# Patient Record
Sex: FEMALE | Race: WHITE | NOT HISPANIC OR LATINO
[De-identification: names, ages, dates, MRNs, and addresses within clinical notes are randomized per-mention and may not be internally consistent; named-entity substitution may affect disease eponyms.]

---

## 2018-02-22 ENCOUNTER — RESULT REVIEW (OUTPATIENT)
Age: 53
End: 2018-02-22

## 2018-04-13 PROBLEM — Z00.00 ENCOUNTER FOR PREVENTIVE HEALTH EXAMINATION: Status: ACTIVE | Noted: 2018-04-13

## 2018-04-16 ENCOUNTER — APPOINTMENT (OUTPATIENT)
Dept: ALLERGY | Facility: CLINIC | Age: 53
End: 2018-04-16
Payer: COMMERCIAL

## 2018-04-16 VITALS
HEART RATE: 80 BPM | HEIGHT: 72 IN | BODY MASS INDEX: 16.12 KG/M2 | SYSTOLIC BLOOD PRESSURE: 110 MMHG | DIASTOLIC BLOOD PRESSURE: 80 MMHG | WEIGHT: 119 LBS | RESPIRATION RATE: 14 BRPM

## 2018-04-16 PROCEDURE — 99203 OFFICE O/P NEW LOW 30 MIN: CPT

## 2018-10-01 ENCOUNTER — RESULT REVIEW (OUTPATIENT)
Age: 53
End: 2018-10-01

## 2018-11-07 ENCOUNTER — RESULT REVIEW (OUTPATIENT)
Age: 53
End: 2018-11-07

## 2019-01-30 ENCOUNTER — APPOINTMENT (OUTPATIENT)
Dept: ULTRASOUND IMAGING | Facility: IMAGING CENTER | Age: 54
End: 2019-01-30
Payer: COMMERCIAL

## 2019-01-30 ENCOUNTER — RESULT REVIEW (OUTPATIENT)
Age: 54
End: 2019-01-30

## 2019-01-30 ENCOUNTER — OUTPATIENT (OUTPATIENT)
Dept: OUTPATIENT SERVICES | Facility: HOSPITAL | Age: 54
LOS: 1 days | End: 2019-01-30
Payer: COMMERCIAL

## 2019-01-30 ENCOUNTER — APPOINTMENT (OUTPATIENT)
Dept: MAMMOGRAPHY | Facility: IMAGING CENTER | Age: 54
End: 2019-01-30
Payer: COMMERCIAL

## 2019-01-30 DIAGNOSIS — Z00.8 ENCOUNTER FOR OTHER GENERAL EXAMINATION: ICD-10-CM

## 2019-01-30 PROCEDURE — 88305 TISSUE EXAM BY PATHOLOGIST: CPT

## 2019-01-30 PROCEDURE — 88305 TISSUE EXAM BY PATHOLOGIST: CPT | Mod: 26

## 2019-01-30 PROCEDURE — 19083 BX BREAST 1ST LESION US IMAG: CPT | Mod: LT

## 2019-01-30 PROCEDURE — 88360 TUMOR IMMUNOHISTOCHEM/MANUAL: CPT

## 2019-01-30 PROCEDURE — 19081 BX BREAST 1ST LESION STRTCTC: CPT | Mod: LT

## 2019-01-30 PROCEDURE — 19083 BX BREAST 1ST LESION US IMAG: CPT

## 2019-01-30 PROCEDURE — 88360 TUMOR IMMUNOHISTOCHEM/MANUAL: CPT | Mod: 26

## 2019-01-30 PROCEDURE — 77065 DX MAMMO INCL CAD UNI: CPT

## 2019-01-30 PROCEDURE — A4648: CPT

## 2019-01-30 PROCEDURE — 77065 DX MAMMO INCL CAD UNI: CPT | Mod: 26,LT

## 2019-01-30 PROCEDURE — 19081 BX BREAST 1ST LESION STRTCTC: CPT

## 2019-02-01 ENCOUNTER — APPOINTMENT (OUTPATIENT)
Dept: MRI IMAGING | Facility: CLINIC | Age: 54
End: 2019-02-01
Payer: COMMERCIAL

## 2019-02-01 ENCOUNTER — OUTPATIENT (OUTPATIENT)
Dept: OUTPATIENT SERVICES | Facility: HOSPITAL | Age: 54
LOS: 1 days | End: 2019-02-01
Payer: COMMERCIAL

## 2019-02-01 DIAGNOSIS — Z00.8 ENCOUNTER FOR OTHER GENERAL EXAMINATION: ICD-10-CM

## 2019-02-01 DIAGNOSIS — C50.812 MALIGNANT NEOPLASM OF OVERLAPPING SITES OF LEFT FEMALE BREAST: ICD-10-CM

## 2019-02-01 PROCEDURE — C8937: CPT

## 2019-02-01 PROCEDURE — 77049 MRI BREAST C-+ W/CAD BI: CPT | Mod: 26

## 2019-02-01 PROCEDURE — C8908: CPT

## 2019-02-01 PROCEDURE — A9585: CPT

## 2019-02-06 ENCOUNTER — RESULT REVIEW (OUTPATIENT)
Age: 54
End: 2019-02-06

## 2019-02-21 ENCOUNTER — OUTPATIENT (OUTPATIENT)
Dept: OUTPATIENT SERVICES | Facility: HOSPITAL | Age: 54
LOS: 1 days | End: 2019-02-21
Payer: COMMERCIAL

## 2019-02-21 VITALS
WEIGHT: 117.95 LBS | DIASTOLIC BLOOD PRESSURE: 70 MMHG | HEIGHT: 67 IN | RESPIRATION RATE: 16 BRPM | OXYGEN SATURATION: 100 % | TEMPERATURE: 99 F | SYSTOLIC BLOOD PRESSURE: 120 MMHG | HEART RATE: 68 BPM

## 2019-02-21 DIAGNOSIS — Z98.890 OTHER SPECIFIED POSTPROCEDURAL STATES: Chronic | ICD-10-CM

## 2019-02-21 DIAGNOSIS — D49.3 NEOPLASM OF UNSPECIFIED BEHAVIOR OF BREAST: ICD-10-CM

## 2019-02-21 DIAGNOSIS — C50.812 MALIGNANT NEOPLASM OF OVERLAPPING SITES OF LEFT FEMALE BREAST: ICD-10-CM

## 2019-02-21 DIAGNOSIS — Z01.818 ENCOUNTER FOR OTHER PREPROCEDURAL EXAMINATION: ICD-10-CM

## 2019-02-21 DIAGNOSIS — D05.02 LOBULAR CARCINOMA IN SITU OF LEFT BREAST: ICD-10-CM

## 2019-02-21 LAB
ANION GAP SERPL CALC-SCNC: 12 MMOL/L — SIGNIFICANT CHANGE UP (ref 5–17)
BUN SERPL-MCNC: 9 MG/DL — SIGNIFICANT CHANGE UP (ref 7–23)
CALCIUM SERPL-MCNC: 10 MG/DL — SIGNIFICANT CHANGE UP (ref 8.4–10.5)
CHLORIDE SERPL-SCNC: 99 MMOL/L — SIGNIFICANT CHANGE UP (ref 96–108)
CO2 SERPL-SCNC: 28 MMOL/L — SIGNIFICANT CHANGE UP (ref 22–31)
CREAT SERPL-MCNC: 0.73 MG/DL — SIGNIFICANT CHANGE UP (ref 0.5–1.3)
GLUCOSE SERPL-MCNC: 85 MG/DL — SIGNIFICANT CHANGE UP (ref 70–99)
POTASSIUM SERPL-MCNC: 3.9 MMOL/L — SIGNIFICANT CHANGE UP (ref 3.5–5.3)
POTASSIUM SERPL-SCNC: 3.9 MMOL/L — SIGNIFICANT CHANGE UP (ref 3.5–5.3)
SODIUM SERPL-SCNC: 139 MMOL/L — SIGNIFICANT CHANGE UP (ref 135–145)

## 2019-02-21 PROCEDURE — G0463: CPT

## 2019-02-21 PROCEDURE — 85027 COMPLETE CBC AUTOMATED: CPT

## 2019-02-21 PROCEDURE — 80048 BASIC METABOLIC PNL TOTAL CA: CPT

## 2019-02-21 RX ORDER — LIDOCAINE HCL 20 MG/ML
0.1 VIAL (ML) INJECTION ONCE
Qty: 0 | Refills: 0 | Status: DISCONTINUED | OUTPATIENT
Start: 2019-03-07 | End: 2019-03-22

## 2019-02-21 RX ORDER — SODIUM CHLORIDE 9 MG/ML
3 INJECTION INTRAMUSCULAR; INTRAVENOUS; SUBCUTANEOUS EVERY 8 HOURS
Qty: 0 | Refills: 0 | Status: DISCONTINUED | OUTPATIENT
Start: 2019-03-07 | End: 2019-03-22

## 2019-02-21 NOTE — H&P PST ADULT - PROBLEM SELECTOR PLAN 1
left breast lumpectomy post STALIN  reflector placement, left axillary sentinel lymph node biopsy, possible left axillary lymph node dissection  Labs- CBC, BMP

## 2019-02-21 NOTE — H&P PST ADULT - ATTENDING COMMENTS
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  She presents to undergo a left radhika  localization lumpectomy, left axillary sentinel lymph node biopsy, possible left axillary lymph node dissection and left upper outer breast radhika  localization breast biopsy.

## 2019-02-21 NOTE — H&P PST ADULT - NSANTHOSAYNRD_GEN_A_CORE
No. JESSA screening performed.  STOP BANG Legend: 0-2 = LOW Risk; 3-4 = INTERMEDIATE Risk; 5-8 = HIGH Risk

## 2019-02-21 NOTE — H&P PST ADULT - CARDIOVASCULAR
CC:  cough        Cough  This is a new problem. The current episode started 2 days ago. The problem has been unchanged. The problem occurs constantly. The cough is dry. Associated symptoms include : fatigue, muscle aches, fever. Pertinent negatives include no   headaches, nausea, vomiting, diarrhea, sweats, weight loss or wheezing. Nothing aggravates the symptoms.  Patient has tried nothing for the symptoms. There is no history of asthma.          #2.  C/o dysuria, frequency x 7 d.   Denies flank pain or hematuria          Past Medical History:   Diagnosis Date   • Arthritis    • Diverticulitis     medicated   • GERD (gastroesophageal reflux disease)     medicated   • Glaucoma    • Heart burn    • Hiatus hernia syndrome    • Hypertension     medicated   • Unspecified urinary incontinence          Social History   Substance Use Topics   • Smoking status: Never Smoker   • Smokeless tobacco: Never Used   • Alcohol use 0.0 oz/week      Comment: occassional wine         Current Outpatient Prescriptions on File Prior to Visit   Medication Sig Dispense Refill   • amLODIPine (NORVASC) 5 MG Tab TAKE 1 TABLET BY MOUTH ONCE DAILY 90 Tab 0   • lisinopril (PRINIVIL) 10 MG Tab TAKE 1 TABLET BY MOUTH EVERY 12 HOURS 180 Tab 3   • metoprolol SR (TOPROL XL) 25 MG TABLET SR 24 HR Take 1 Tab by mouth every day. 90 Tab 3   • omeprazole (PRILOSEC) 20 MG delayed-release capsule TAKE ONE CAPSULE BY MOUTH TWICE A DAY 30MINUTES BEFORE BREAKFAST AND DINNER 60 Cap 11   • doxycycline (VIBRAMYCIN) 100 MG Tab Take 1 Tab by mouth 2 times a day. 14 Tab 0   • fluticasone (FLONASE) 50 MCG/ACT nasal spray ONE SPRAY EACH NOSTRIL TWO TIMES PER DAY. 1 Bottle 1   • acyclovir (ZOVIRAX) 5 % Ointment Apply to affected area(s) every 3 hours. 15 g 1   • meclizine (ANTIVERT) 25 MG Tab TAKE 1 TAB BY MOUTH 3 TIMES A DAY AS NEEDED FOR DIZZINESS. 30 Tab 0   • azelastine (ASTELIN) 137 MCG/SPRAY nasal spray INSTILL 1 SPRAY INTO NOSTRIL TWICE DAILY 90 [place] 2   •  levothyroxine (SYNTHROID) 75 MCG Tab Take 1 Tab by mouth every day. 90 Tab 3   • Cholecalciferol (VITAMIN D PO) Take 1 Tab by mouth every day.     • travoprost (TRAVATAN) 0.004 % SOLN Place 1 Drop in both eyes every evening.       No current facility-administered medications on file prior to visit.                     Review of Systems   Constitutional: Negative for fever and weight loss.   HENT: negative for otalgia  Cardiovascular - denies chest pain or dyspnea  Respiratory: Positive for cough.  .  Negative for wheezing.    Neurological: Negative for headaches.   GI - denies nausea, vomiting or diarrhea  Neuro - denies numbness or tingling.            Objective:     Blood pressure 108/56, pulse 62, temperature 37.5 °C (99.5 °F), resp. rate 16, weight 66.2 kg (146 lb), SpO2 93 %.    Physical Exam   Constitutional: patient is oriented to person, place, and time. Patient appears well-developed and well-nourished. No distress.   HENT:   Head: Normocephalic and atraumatic.   Right Ear: External ear normal.   Left Ear: External ear normal.   Nose: Mucosal edema  present. Right sinus exhibits no maxillary sinus tenderness. Left sinus exhibits no maxillary sinus tenderness.   Mouth/Throat: Mucous membranes are normal. No oral lesions.  No posterior pharyngeal erythema.  No oropharyngeal exudate or posterior oropharyngeal edema.   Eyes: Conjunctivae and EOM are normal. Pupils are equal, round, and reactive to light. Right eye exhibits no discharge. Left eye exhibits no discharge. No scleral icterus.   Neck: Normal range of motion. Neck supple. No tracheal deviation present.   Cardiovascular: Normal rate, regular rhythm and normal heart sounds.  Exam reveals no friction rub.    Pulmonary/Chest: Effort normal. No respiratory distress. Patient has no wheezes or rhonchi. Patient has no rales.    Musculoskeletal:  exhibits no edema.   Lymphadenopathy:     Patient has no cervical adenopathy.      Neurological: patient is alert  and oriented to person, place, and time.   Skin: Skin is warm and dry. No rash noted. No erythema.   Psychiatric: patient  has a normal mood and affect.  behavior is normal.   Nursing note and vitals reviewed.              Assessment/Plan:       1. Acute cystitis without hematuria  -UA c/w UTI   - nitrofurantoin monohydr macro (MACROBID) 100 MG Cap; Take 1 Cap by mouth every 12 hours for 5 days.  Dispense: 10 Cap; Refill: 0      2. Viral URI with cough   - benzonatate (TESSALON) 100 MG Cap; Take 1 Cap by mouth 3 times a day as needed for Cough.  Dispense: 30 Cap; Refill: 0    Follow up in one week if no improvement     negative Regular rate & rhythm, normal S1, S2; no murmurs, gallops or rubs; no S3, S4

## 2019-02-21 NOTE — H&P PST ADULT - HISTORY OF PRESENT ILLNESS
55 yo female c/o abnormal mammogram in  01/2019- s/o biopsy revealed malignant neoplasm breast. Pt scheduled for left breast lumpectomy post STALIN  reflector placement, left axillary sentinel lymph node biopsy, possible left axillary lymph node dissection on 03/07/2019.

## 2019-02-22 ENCOUNTER — OUTPATIENT (OUTPATIENT)
Dept: OUTPATIENT SERVICES | Facility: HOSPITAL | Age: 54
LOS: 1 days | End: 2019-02-22
Payer: COMMERCIAL

## 2019-02-22 ENCOUNTER — APPOINTMENT (OUTPATIENT)
Dept: ULTRASOUND IMAGING | Facility: IMAGING CENTER | Age: 54
End: 2019-02-22
Payer: COMMERCIAL

## 2019-02-22 DIAGNOSIS — Z00.8 ENCOUNTER FOR OTHER GENERAL EXAMINATION: ICD-10-CM

## 2019-02-22 DIAGNOSIS — Z98.890 OTHER SPECIFIED POSTPROCEDURAL STATES: Chronic | ICD-10-CM

## 2019-02-22 LAB
HCT VFR BLD CALC: 41.2 % — SIGNIFICANT CHANGE UP (ref 34.5–45)
HGB BLD-MCNC: 13.4 G/DL — SIGNIFICANT CHANGE UP (ref 11.5–15.5)
MCHC RBC-ENTMCNC: 32 PG — SIGNIFICANT CHANGE UP (ref 27–34)
MCHC RBC-ENTMCNC: 32.5 GM/DL — SIGNIFICANT CHANGE UP (ref 32–36)
MCV RBC AUTO: 98.3 FL — SIGNIFICANT CHANGE UP (ref 80–100)
PLATELET # BLD AUTO: 322 K/UL — SIGNIFICANT CHANGE UP (ref 150–400)
RBC # BLD: 4.19 M/UL — SIGNIFICANT CHANGE UP (ref 3.8–5.2)
RBC # FLD: 12.3 % — SIGNIFICANT CHANGE UP (ref 10.3–14.5)
WBC # BLD: 6.06 K/UL — SIGNIFICANT CHANGE UP (ref 3.8–10.5)
WBC # FLD AUTO: 6.06 K/UL — SIGNIFICANT CHANGE UP (ref 3.8–10.5)

## 2019-02-22 PROCEDURE — 76642 ULTRASOUND BREAST LIMITED: CPT | Mod: 26,LT

## 2019-02-22 PROCEDURE — 76642 ULTRASOUND BREAST LIMITED: CPT

## 2019-03-05 ENCOUNTER — OUTPATIENT (OUTPATIENT)
Dept: OUTPATIENT SERVICES | Facility: HOSPITAL | Age: 54
LOS: 1 days | End: 2019-03-05
Payer: COMMERCIAL

## 2019-03-05 ENCOUNTER — RESULT REVIEW (OUTPATIENT)
Age: 54
End: 2019-03-05

## 2019-03-05 ENCOUNTER — APPOINTMENT (OUTPATIENT)
Dept: MAMMOGRAPHY | Facility: IMAGING CENTER | Age: 54
End: 2019-03-05
Payer: COMMERCIAL

## 2019-03-05 DIAGNOSIS — Z98.890 OTHER SPECIFIED POSTPROCEDURAL STATES: Chronic | ICD-10-CM

## 2019-03-05 DIAGNOSIS — Z00.8 ENCOUNTER FOR OTHER GENERAL EXAMINATION: ICD-10-CM

## 2019-03-05 PROBLEM — D49.3 NEOPLASM OF UNSPECIFIED BEHAVIOR OF BREAST: Chronic | Status: ACTIVE | Noted: 2019-02-21

## 2019-03-05 PROCEDURE — 19282 PERQ DEVICE BREAST EA IMAG: CPT | Mod: LT

## 2019-03-05 PROCEDURE — 19282 PERQ DEVICE BREAST EA IMAG: CPT

## 2019-03-05 PROCEDURE — 19281 PERQ DEVICE BREAST 1ST IMAG: CPT | Mod: LT

## 2019-03-05 PROCEDURE — C1739: CPT

## 2019-03-05 PROCEDURE — 19281 PERQ DEVICE BREAST 1ST IMAG: CPT

## 2019-03-06 ENCOUNTER — TRANSCRIPTION ENCOUNTER (OUTPATIENT)
Age: 54
End: 2019-03-06

## 2019-03-07 ENCOUNTER — OUTPATIENT (OUTPATIENT)
Dept: OUTPATIENT SERVICES | Facility: HOSPITAL | Age: 54
LOS: 1 days | End: 2019-03-07
Payer: COMMERCIAL

## 2019-03-07 ENCOUNTER — APPOINTMENT (OUTPATIENT)
Dept: NUCLEAR MEDICINE | Facility: HOSPITAL | Age: 54
End: 2019-03-07

## 2019-03-07 ENCOUNTER — RESULT REVIEW (OUTPATIENT)
Age: 54
End: 2019-03-07

## 2019-03-07 VITALS
DIASTOLIC BLOOD PRESSURE: 74 MMHG | TEMPERATURE: 97 F | SYSTOLIC BLOOD PRESSURE: 119 MMHG | OXYGEN SATURATION: 100 % | HEART RATE: 62 BPM | RESPIRATION RATE: 16 BRPM

## 2019-03-07 VITALS
HEART RATE: 61 BPM | HEIGHT: 67 IN | RESPIRATION RATE: 18 BRPM | SYSTOLIC BLOOD PRESSURE: 130 MMHG | DIASTOLIC BLOOD PRESSURE: 80 MMHG | WEIGHT: 117.95 LBS | OXYGEN SATURATION: 99 % | TEMPERATURE: 98 F

## 2019-03-07 DIAGNOSIS — D05.02 LOBULAR CARCINOMA IN SITU OF LEFT BREAST: ICD-10-CM

## 2019-03-07 DIAGNOSIS — Z98.890 OTHER SPECIFIED POSTPROCEDURAL STATES: Chronic | ICD-10-CM

## 2019-03-07 DIAGNOSIS — C50.812 MALIGNANT NEOPLASM OF OVERLAPPING SITES OF LEFT FEMALE BREAST: ICD-10-CM

## 2019-03-07 PROCEDURE — 88307 TISSUE EXAM BY PATHOLOGIST: CPT | Mod: 26

## 2019-03-07 PROCEDURE — 76098 X-RAY EXAM SURGICAL SPECIMEN: CPT | Mod: 26

## 2019-03-07 PROCEDURE — 88304 TISSUE EXAM BY PATHOLOGIST: CPT | Mod: 26

## 2019-03-07 PROCEDURE — 38792 RA TRACER ID OF SENTINL NODE: CPT

## 2019-03-07 PROCEDURE — 88307 TISSUE EXAM BY PATHOLOGIST: CPT

## 2019-03-07 PROCEDURE — 76098 X-RAY EXAM SURGICAL SPECIMEN: CPT

## 2019-03-07 PROCEDURE — A9541: CPT

## 2019-03-07 PROCEDURE — 19301 PARTIAL MASTECTOMY: CPT | Mod: LT

## 2019-03-07 PROCEDURE — 38525 BIOPSY/REMOVAL LYMPH NODES: CPT | Mod: LT

## 2019-03-07 PROCEDURE — 88304 TISSUE EXAM BY PATHOLOGIST: CPT

## 2019-03-07 RX ORDER — OXYCODONE HYDROCHLORIDE 5 MG/1
10 TABLET ORAL ONCE
Qty: 0 | Refills: 0 | Status: DISCONTINUED | OUTPATIENT
Start: 2019-03-07 | End: 2019-03-07

## 2019-03-07 RX ORDER — OXYCODONE HYDROCHLORIDE 5 MG/1
5 TABLET ORAL ONCE
Qty: 0 | Refills: 0 | Status: DISCONTINUED | OUTPATIENT
Start: 2019-03-07 | End: 2019-03-07

## 2019-03-07 RX ORDER — ACETAMINOPHEN 500 MG
1000 TABLET ORAL ONCE
Qty: 0 | Refills: 0 | Status: COMPLETED | OUTPATIENT
Start: 2019-03-07 | End: 2019-03-07

## 2019-03-07 RX ORDER — CELECOXIB 200 MG/1
200 CAPSULE ORAL ONCE
Qty: 0 | Refills: 0 | Status: COMPLETED | OUTPATIENT
Start: 2019-03-07 | End: 2019-03-07

## 2019-03-07 RX ORDER — ONDANSETRON 8 MG/1
4 TABLET, FILM COATED ORAL ONCE
Qty: 0 | Refills: 0 | Status: COMPLETED | OUTPATIENT
Start: 2019-03-07 | End: 2019-03-07

## 2019-03-07 RX ORDER — SODIUM CHLORIDE 9 MG/ML
1000 INJECTION, SOLUTION INTRAVENOUS
Qty: 0 | Refills: 0 | Status: DISCONTINUED | OUTPATIENT
Start: 2019-03-07 | End: 2019-03-22

## 2019-03-07 RX ORDER — CEFAZOLIN SODIUM 1 G
2000 VIAL (EA) INJECTION ONCE
Qty: 0 | Refills: 0 | Status: COMPLETED | OUTPATIENT
Start: 2019-03-07 | End: 2019-03-07

## 2019-03-07 RX ADMIN — OXYCODONE HYDROCHLORIDE 5 MILLIGRAM(S): 5 TABLET ORAL at 15:29

## 2019-03-07 RX ADMIN — ONDANSETRON 4 MILLIGRAM(S): 8 TABLET, FILM COATED ORAL at 15:30

## 2019-03-07 RX ADMIN — Medication 1000 MILLIGRAM(S): at 11:59

## 2019-03-07 RX ADMIN — CELECOXIB 200 MILLIGRAM(S): 200 CAPSULE ORAL at 11:59

## 2019-03-07 RX ADMIN — CELECOXIB 200 MILLIGRAM(S): 200 CAPSULE ORAL at 15:29

## 2019-03-07 NOTE — ASU DISCHARGE PLAN (ADULT/PEDIATRIC) - ASU DC SPECIAL INSTRUCTIONSFT
Susan M. Palleschi, MD, FACS  Specializing in Diseases and Surgery of the Breast  [address], [address], [place] [address]  Tel: [phone]		Fax: [phone]    Breast Biopsy/Lumpectomy Discharge Instructions    1.	Follow-up Appointment- Please call the office [phone]) to schedule your post-operative appointment which should be approximately 7-10 days after your surgery.     2.	Bruising/Bleeding/ Swelling – It is normal for there to be some bruising and swelling at the breast biopsy site. Some discomfort at the surgical site is also normal. If your symptoms seem excessive, or if you have any questions or concerns, please call the office.    3.	Supportive Bra- Please bring a sports/athlete bra with a front or back closure with you on the day of surgery. You will wear it home from the hospital. You should wear the supportive bra continuously for 48 hours after your procedure, including wearing it to sleep. Thereafter, you may regular bra. You may remove the bra to shower. The sports bra will provide support, decrease the amount of swelling at the biopsy site and make your recovery more comfortable.    4.	Wound Dressing – The incision(s) will be covered with a special type of surgery glue called Dermabond. It has a purplish hue and looks like plastic. It should stay on the skin until it flakes off naturally over the following 1-2 weeks. Please do NOT peel off the Dermabond. All of the stitches are “internal” and will dissolve naturally.     5.	Ice- You may apply ice to the surgical sites off and on as needed for symptomatic relief.     6.	Showering/Bathing- You may shower over the incision the very next day after surgery. Allow the water to run over the incision, but do NOT scrub the area. It is best not to sit in a bathtub or swimming pool for at least 1 week after surgery.     7.	Activity Level- You may resume most normal daily activity as tolerated, but avoid strenuous activities such as aerobics, jogging, exercising, or heavy lifting for at least 1 week after surgery. You may return to work in 1-2 days after surgery. You may drive as long as you are not taking any prescription pain medication.     8.	Pain Medication- We will send a pain medication prescription to your pharmacy. You may take the prescribed medication, or you may take extra strength Tylenol as needed. Please do NOT take aspirin, Motrin, Advil, or other anti-inflammatory medications, as these medications may cause bleeding and bruising.         Anesthesia Precautions:  For the next 12 hours do not:   •	drive a car,  •	 drink alcohol, beer, or wine,   •	make important personal or business decisions  Diet:   •	Progress diet slowly unless otherwise indicated  Physician Notification  •	Any pharmacy prescription issue  •	Bleeding that does not stop  •	Persistent nausea and vomiting  •	Pain not relieved by medications  •	Fever greater than 101®F  •	Inability to tolerate liquids or foods  •	Unable to urinate after 8 hours  Discharge and Disposition  •	Discharge to home/ group home/assisted living  •	Accompanied by Family/Spouse/ Parents/ Significant Other/ Friend/ and or Caregiver  Follow Up Care:  •	In the event that you develop a complication and you are unable to reach your own physician, you may contact:  [phone] or go to the nearest Emergency Room.   •	Please call your surgeon to schedule your follow up appointment [phone]

## 2019-03-07 NOTE — BRIEF OPERATIVE NOTE - POST-OP DX
Lobular carcinoma in situ (LCIS) of left breast  03/07/2019  upper outer  Active  Palleschi, Susan M  Malignant neoplasm of overlapping sites of left breast in female, estrogen receptor positive  03/07/2019  12:00  Active  Palleschi, Susan M

## 2019-03-07 NOTE — ASU DISCHARGE PLAN (ADULT/PEDIATRIC) - CALL YOUR DOCTOR IF YOU HAVE ANY OF THE FOLLOWING:
Nausea and vomiting that does not stop/Bleeding that does not stop/Inability to tolerate liquids or foods/Pain not relieved by Medications/Wound/Surgical Site with redness, or foul smelling discharge or pus/Swelling that gets worse

## 2019-03-07 NOTE — ASU DISCHARGE PLAN (ADULT/PEDIATRIC) - CARE PROVIDER_API CALL
Palleschi, Susan M (MD)  Surgery  [address], [address]  [place] [address]  Phone: [phone]  Fax: [phone]  Follow Up Time:

## 2019-03-07 NOTE — BRIEF OPERATIVE NOTE - OPERATION/FINDINGS
left upper outer breast specimen radiograph revealed  left 12:00 lumpectomy specimen radiograph revealed left upper lumpectomy specimen radiograph revealed both clips, both radhika  reflectors and calcifications within specimen

## 2019-03-07 NOTE — BRIEF OPERATIVE NOTE - SPECIMENS
left axillary sentinel lymph node biopsy x  , left 12:00 breast lumpectomy, left upper outer breast biopsy, left 1:30 breast core biopsy scar left axillary sentinel lymph node biopsy x 6, left 12:00 breast lumpectomy, left 1:30 breast core biopsy scar

## 2019-03-07 NOTE — BRIEF OPERATIVE NOTE - PRE-OP DX
Lobular carcinoma in situ (LCIS) of left breast  03/07/2019  left upper outer  Active  Palleschi, Susan M  Malignant neoplasm of overlapping sites of left breast in female, estrogen receptor positive  03/07/2019  12:00  Active  Palleschi, Susan M

## 2019-03-07 NOTE — BRIEF OPERATIVE NOTE - PROCEDURE
<<-----Click on this checkbox to enter Procedure Breast biopsy, left  03/07/2019  upper outer with radhika  localization  Active  SPALLESC  Lumpectomy of left breast with sentinel node biopsy  03/07/2019  12:00 with radhika  localization  Active  SPALLESC Lumpectomy of left breast with sentinel node biopsy  03/07/2019  12:00 with radhika  localization  Active  Palleschi, Susan M

## 2019-03-12 LAB — SURGICAL PATHOLOGY STUDY: SIGNIFICANT CHANGE UP

## 2019-03-15 ENCOUNTER — OUTPATIENT (OUTPATIENT)
Dept: OUTPATIENT SERVICES | Facility: HOSPITAL | Age: 54
LOS: 1 days | End: 2019-03-15
Payer: COMMERCIAL

## 2019-03-15 ENCOUNTER — RESULT REVIEW (OUTPATIENT)
Age: 54
End: 2019-03-15

## 2019-03-15 DIAGNOSIS — C50.919 MALIGNANT NEOPLASM OF UNSPECIFIED SITE OF UNSPECIFIED FEMALE BREAST: ICD-10-CM

## 2019-03-15 DIAGNOSIS — Z98.890 OTHER SPECIFIED POSTPROCEDURAL STATES: Chronic | ICD-10-CM

## 2019-03-15 PROCEDURE — 88363 XM ARCHIVE TISSUE MOLEC ANAL: CPT

## 2019-03-24 LAB — SURGICAL PATHOLOGY STUDY: SIGNIFICANT CHANGE UP

## 2019-04-01 ENCOUNTER — OUTPATIENT (OUTPATIENT)
Dept: OUTPATIENT SERVICES | Facility: HOSPITAL | Age: 54
LOS: 1 days | Discharge: HOME | End: 2019-04-01

## 2019-04-01 DIAGNOSIS — Z98.890 OTHER SPECIFIED POSTPROCEDURAL STATES: Chronic | ICD-10-CM

## 2019-04-01 DIAGNOSIS — C50.919 MALIGNANT NEOPLASM OF UNSPECIFIED SITE OF UNSPECIFIED FEMALE BREAST: ICD-10-CM

## 2019-04-05 ENCOUNTER — OUTPATIENT (OUTPATIENT)
Dept: OUTPATIENT SERVICES | Facility: HOSPITAL | Age: 54
LOS: 1 days | Discharge: HOME | End: 2019-04-05
Payer: COMMERCIAL

## 2019-04-05 DIAGNOSIS — Z98.890 OTHER SPECIFIED POSTPROCEDURAL STATES: Chronic | ICD-10-CM

## 2019-04-08 ENCOUNTER — APPOINTMENT (OUTPATIENT)
Dept: HEMATOLOGY ONCOLOGY | Facility: CLINIC | Age: 54
End: 2019-04-08
Payer: COMMERCIAL

## 2019-04-08 ENCOUNTER — RESULT REVIEW (OUTPATIENT)
Age: 54
End: 2019-04-08

## 2019-04-08 ENCOUNTER — OTHER (OUTPATIENT)
Age: 54
End: 2019-04-08

## 2019-04-08 VITALS
SYSTOLIC BLOOD PRESSURE: 136 MMHG | HEART RATE: 58 BPM | WEIGHT: 117.95 LBS | BODY MASS INDEX: 18.3 KG/M2 | RESPIRATION RATE: 16 BRPM | DIASTOLIC BLOOD PRESSURE: 84 MMHG | TEMPERATURE: 97.9 F | OXYGEN SATURATION: 100 % | HEIGHT: 67.5 IN

## 2019-04-08 DIAGNOSIS — Z84.1 FAMILY HISTORY OF DISORDERS OF KIDNEY AND URETER: ICD-10-CM

## 2019-04-08 DIAGNOSIS — Z13.79 ENCOUNTER FOR OTHER SCREENING FOR GENETIC AND CHROMOSOMAL ANOMALIES: ICD-10-CM

## 2019-04-08 DIAGNOSIS — Z87.898 PERSONAL HISTORY OF OTHER SPECIFIED CONDITIONS: ICD-10-CM

## 2019-04-08 DIAGNOSIS — N92.6 IRREGULAR MENSTRUATION, UNSPECIFIED: ICD-10-CM

## 2019-04-08 DIAGNOSIS — Z80.8 FAMILY HISTORY OF MALIGNANT NEOPLASM OF OTHER ORGANS OR SYSTEMS: ICD-10-CM

## 2019-04-08 DIAGNOSIS — Z83.79 FAMILY HISTORY OF OTHER DISEASES OF THE DIGESTIVE SYSTEM: ICD-10-CM

## 2019-04-08 LAB
ALBUMIN SERPL ELPH-MCNC: 4.7 G/DL
ALP BLD-CCNC: 57 U/L
ALT SERPL-CCNC: 13 U/L
ANION GAP SERPL CALC-SCNC: 12 MMOL/L
AST SERPL-CCNC: 26 U/L
BASOPHILS # BLD AUTO: 0.1 K/UL — SIGNIFICANT CHANGE UP (ref 0–0.2)
BASOPHILS NFR BLD AUTO: 2.4 % — HIGH (ref 0–2)
BILIRUB SERPL-MCNC: 0.4 MG/DL
BUN SERPL-MCNC: 6 MG/DL
CALCIUM SERPL-MCNC: 10 MG/DL
CHLORIDE SERPL-SCNC: 104 MMOL/L
CO2 SERPL-SCNC: 27 MMOL/L
CREAT SERPL-MCNC: 0.79 MG/DL
EOSINOPHIL # BLD AUTO: 0 K/UL — SIGNIFICANT CHANGE UP (ref 0–0.5)
EOSINOPHIL NFR BLD AUTO: 1.5 % — SIGNIFICANT CHANGE UP (ref 0–6)
ESTRADIOL SERPL-MCNC: <5 PG/ML
FSH SERPL-MCNC: 116 IU/L
GLUCOSE SERPL-MCNC: 89 MG/DL
HCT VFR BLD CALC: 40.2 % — SIGNIFICANT CHANGE UP (ref 34.5–45)
HGB BLD-MCNC: 14 G/DL — SIGNIFICANT CHANGE UP (ref 11.5–15.5)
LH SERPL-ACNC: 60.9 IU/L
LYMPHOCYTES # BLD AUTO: 1.3 K/UL — SIGNIFICANT CHANGE UP (ref 1–3.3)
LYMPHOCYTES # BLD AUTO: 42.9 % — SIGNIFICANT CHANGE UP (ref 13–44)
MCHC RBC-ENTMCNC: 32.5 PG — SIGNIFICANT CHANGE UP (ref 27–34)
MCHC RBC-ENTMCNC: 34.8 G/DL — SIGNIFICANT CHANGE UP (ref 32–36)
MCV RBC AUTO: 93.2 FL — SIGNIFICANT CHANGE UP (ref 80–100)
MONOCYTES # BLD AUTO: 0.3 K/UL — SIGNIFICANT CHANGE UP (ref 0–0.9)
MONOCYTES NFR BLD AUTO: 8.6 % — SIGNIFICANT CHANGE UP (ref 2–14)
NEUTROPHILS # BLD AUTO: 1.4 K/UL — LOW (ref 1.8–7.4)
NEUTROPHILS NFR BLD AUTO: 44.6 % — SIGNIFICANT CHANGE UP (ref 43–77)
PLATELET # BLD AUTO: 321 K/UL — SIGNIFICANT CHANGE UP (ref 150–400)
POTASSIUM SERPL-SCNC: 4.4 MMOL/L
PROT SERPL-MCNC: 7.1 G/DL
RBC # BLD: 4.32 M/UL — SIGNIFICANT CHANGE UP (ref 3.8–5.2)
RBC # FLD: 10.7 % — SIGNIFICANT CHANGE UP (ref 10.3–14.5)
SODIUM SERPL-SCNC: 143 MMOL/L
WBC # BLD: 3.1 K/UL — LOW (ref 3.8–10.5)
WBC # FLD AUTO: 3.1 K/UL — LOW (ref 3.8–10.5)

## 2019-04-08 PROCEDURE — 99245 OFF/OP CONSLTJ NEW/EST HI 55: CPT

## 2019-04-08 RX ORDER — ASPIRIN 81 MG
600-200 TABLET, DELAYED RELEASE (ENTERIC COATED) ORAL
Refills: 0 | Status: ACTIVE | COMMUNITY
Start: 2019-04-08

## 2019-04-09 ENCOUNTER — OTHER (OUTPATIENT)
Age: 54
End: 2019-04-09

## 2019-04-10 ENCOUNTER — APPOINTMENT (OUTPATIENT)
Dept: RADIATION ONCOLOGY | Facility: CLINIC | Age: 54
End: 2019-04-10
Payer: COMMERCIAL

## 2019-04-10 ENCOUNTER — OTHER (OUTPATIENT)
Age: 54
End: 2019-04-10

## 2019-04-10 VITALS
SYSTOLIC BLOOD PRESSURE: 115 MMHG | RESPIRATION RATE: 14 BRPM | HEART RATE: 67 BPM | TEMPERATURE: 97.88 F | DIASTOLIC BLOOD PRESSURE: 77 MMHG | OXYGEN SATURATION: 97 % | WEIGHT: 117 LBS | HEIGHT: 67.5 IN | BODY MASS INDEX: 18.15 KG/M2

## 2019-04-10 PROCEDURE — 99244 OFF/OP CNSLTJ NEW/EST MOD 40: CPT | Mod: 25

## 2019-04-10 RX ORDER — PREDNISONE 10 MG/1
10 TABLET ORAL
Qty: 12 | Refills: 0 | Status: DISCONTINUED | COMMUNITY
Start: 2018-04-30 | End: 2019-04-10

## 2019-04-10 RX ORDER — PREDNISONE 10 MG/1
10 TABLET ORAL
Qty: 18 | Refills: 0 | Status: DISCONTINUED | COMMUNITY
Start: 2018-04-16 | End: 2019-04-10

## 2019-04-10 NOTE — VITALS
[Maximal Pain Intensity: 0/10] : 0/10 [Least Pain Intensity: 0/10] : 0/10 [90: Able to carry normal activity; minor signs or symptoms of disease.] : 90: Able to carry normal activity; minor signs or symptoms of disease.  [ECOG Performance Status: 0 - Fully active, able to carry on all pre-disease performance without restriction] : Performance Status: 0 - Fully active, able to carry on all pre-disease performance without restriction [Date: ____________] : Patient's last distress assessment performed on [unfilled]. [4 - Distress Level] : Distress Level: 4

## 2019-04-10 NOTE — OB/GYN HISTORY
[Definite:  ___ (Date)] : the last menstrual period was [unfilled] [History of Birth Control Pills] : Patient has a history of taking birth control pills [___] : Living: [unfilled]

## 2019-04-11 ENCOUNTER — OTHER (OUTPATIENT)
Age: 54
End: 2019-04-11

## 2019-04-15 ENCOUNTER — APPOINTMENT (OUTPATIENT)
Dept: HEMATOLOGY ONCOLOGY | Facility: CLINIC | Age: 54
End: 2019-04-15
Payer: COMMERCIAL

## 2019-04-15 ENCOUNTER — RESULT REVIEW (OUTPATIENT)
Age: 54
End: 2019-04-15

## 2019-04-15 LAB
BASOPHILS # BLD AUTO: 0.1 K/UL — SIGNIFICANT CHANGE UP (ref 0–0.2)
BASOPHILS NFR BLD AUTO: 1.3 % — SIGNIFICANT CHANGE UP (ref 0–2)
EOSINOPHIL # BLD AUTO: 0.1 K/UL — SIGNIFICANT CHANGE UP (ref 0–0.5)
EOSINOPHIL NFR BLD AUTO: 2.3 % — SIGNIFICANT CHANGE UP (ref 0–6)
HCT VFR BLD CALC: 38.9 % — SIGNIFICANT CHANGE UP (ref 34.5–45)
HGB BLD-MCNC: 13.4 G/DL — SIGNIFICANT CHANGE UP (ref 11.5–15.5)
LYMPHOCYTES # BLD AUTO: 1.7 K/UL — SIGNIFICANT CHANGE UP (ref 1–3.3)
LYMPHOCYTES # BLD AUTO: 39.5 % — SIGNIFICANT CHANGE UP (ref 13–44)
MCHC RBC-ENTMCNC: 31.8 PG — SIGNIFICANT CHANGE UP (ref 27–34)
MCHC RBC-ENTMCNC: 34.4 G/DL — SIGNIFICANT CHANGE UP (ref 32–36)
MCV RBC AUTO: 92.5 FL — SIGNIFICANT CHANGE UP (ref 80–100)
MONOCYTES # BLD AUTO: 0.3 K/UL — SIGNIFICANT CHANGE UP (ref 0–0.9)
MONOCYTES NFR BLD AUTO: 7.2 % — SIGNIFICANT CHANGE UP (ref 2–14)
NEUTROPHILS # BLD AUTO: 2.1 K/UL — SIGNIFICANT CHANGE UP (ref 1.8–7.4)
NEUTROPHILS NFR BLD AUTO: 49.6 % — SIGNIFICANT CHANGE UP (ref 43–77)
PLATELET # BLD AUTO: 270 K/UL — SIGNIFICANT CHANGE UP (ref 150–400)
RBC # BLD: 4.21 M/UL — SIGNIFICANT CHANGE UP (ref 3.8–5.2)
RBC # FLD: 10.9 % — SIGNIFICANT CHANGE UP (ref 10.3–14.5)
WBC # BLD: 4.2 K/UL — SIGNIFICANT CHANGE UP (ref 3.8–10.5)
WBC # FLD AUTO: 4.2 K/UL — SIGNIFICANT CHANGE UP (ref 3.8–10.5)

## 2019-04-15 PROCEDURE — 99499A: CUSTOM | Mod: NC

## 2019-04-22 ENCOUNTER — OUTPATIENT (OUTPATIENT)
Dept: OUTPATIENT SERVICES | Facility: HOSPITAL | Age: 54
LOS: 1 days | Discharge: HOME | End: 2019-04-22
Payer: COMMERCIAL

## 2019-04-22 ENCOUNTER — OUTPATIENT (OUTPATIENT)
Dept: OUTPATIENT SERVICES | Facility: HOSPITAL | Age: 54
LOS: 1 days | Discharge: HOME | End: 2019-04-22

## 2019-04-22 DIAGNOSIS — C50.919 MALIGNANT NEOPLASM OF UNSPECIFIED SITE OF UNSPECIFIED FEMALE BREAST: ICD-10-CM

## 2019-04-22 DIAGNOSIS — Z98.890 OTHER SPECIFIED POSTPROCEDURAL STATES: Chronic | ICD-10-CM

## 2019-04-22 PROCEDURE — 77263 THER RADIOLOGY TX PLNG CPLX: CPT

## 2019-04-23 ENCOUNTER — OUTPATIENT (OUTPATIENT)
Dept: OUTPATIENT SERVICES | Facility: HOSPITAL | Age: 54
LOS: 1 days | Discharge: HOME | End: 2019-04-23

## 2019-04-23 DIAGNOSIS — Z98.890 OTHER SPECIFIED POSTPROCEDURAL STATES: Chronic | ICD-10-CM

## 2019-04-23 DIAGNOSIS — C50.919 MALIGNANT NEOPLASM OF UNSPECIFIED SITE OF UNSPECIFIED FEMALE BREAST: ICD-10-CM

## 2019-04-23 NOTE — PHYSICAL EXAM
[No UE Edema] : there is no upper extremity edema [Breast Abnormal Lactation (Galactorrhea)] : no nipple discharge [Normal] : oriented to person, place and time, the affect was normal, the mood was normal and not anxious [de-identified] : Left lumpectomy and axillary scars are clean and dry, no erythema, no discharge.

## 2019-04-23 NOTE — LETTER CLOSING
[Sincerely yours,] : Sincerely yours, [Consult Closing:] : Thank you for allowing me to participate in the care of this patient.  If you have any questions, please do not hesitate to contact me. [FreeTextEntry3] : Leidy Guzman MD\par \par

## 2019-04-23 NOTE — ADDENDUM
[FreeTextEntry1] : Ms. Sanchez called  on 4/22/19 to make a simulation appointment and will proceed with RT. LL

## 2019-04-24 ENCOUNTER — APPOINTMENT (OUTPATIENT)
Dept: RADIATION ONCOLOGY | Facility: CLINIC | Age: 54
End: 2019-04-24
Payer: COMMERCIAL

## 2019-04-24 ENCOUNTER — APPOINTMENT (OUTPATIENT)
Dept: HEMATOLOGY ONCOLOGY | Facility: CLINIC | Age: 54
End: 2019-04-24
Payer: COMMERCIAL

## 2019-04-24 VITALS
WEIGHT: 117 LBS | HEART RATE: 64 BPM | DIASTOLIC BLOOD PRESSURE: 77 MMHG | SYSTOLIC BLOOD PRESSURE: 124 MMHG | BODY MASS INDEX: 18.36 KG/M2 | TEMPERATURE: 208.04 F | OXYGEN SATURATION: 95 % | HEIGHT: 67 IN | RESPIRATION RATE: 14 BRPM

## 2019-04-24 PROCEDURE — 99214 OFFICE O/P EST MOD 30 MIN: CPT

## 2019-04-24 PROCEDURE — 77290 THER RAD SIMULAJ FIELD CPLX: CPT | Mod: 26

## 2019-04-24 PROCEDURE — 99215 OFFICE O/P EST HI 40 MIN: CPT | Mod: 25

## 2019-04-24 PROCEDURE — 77334 RADIATION TREATMENT AID(S): CPT | Mod: 26

## 2019-04-24 NOTE — PHYSICAL EXAM
[Breast Abnormal Lactation (Galactorrhea)] : no nipple discharge [No UE Edema] : there is no upper extremity edema [Normal] : oriented to person, place and time, the affect was normal, the mood was normal and not anxious [de-identified] : Examination performed in the presence of a female chaperone.  Seroma noted left upper breast, mild tenderness.

## 2019-04-24 NOTE — DISEASE MANAGEMENT
[Pathological] : TNM Stage: p [IA] : IA [FreeTextEntry4] : left breast IDC, grade 1/2, ER+/VA+, HER2- [NTNM] : 0 [TTNM] : 1b [MTNM] : 0

## 2019-04-24 NOTE — HISTORY OF PRESENT ILLNESS
[FreeTextEntry1] : 54 year old woman with recently diagnosed multifocal invasive ductal left breast carcinoma, presents today for a second opinion on adjuvant radiation therapy.\par \par She underwent bilateral screening mammogram with tomosynthesis on 1/22/19 after missing the prior year, and was noted to have architectural distortion of the left superior breast.  Bilateral complete breast ultrasound on 1/22/19 showed a stable 1.1 CM nodule in the right 9:00.\par \par Diagnostic left mammogram and noted a left breast ultrasound were performed on 1/28/19, and showed a 1.1 CM spiculated mass in the left breast 12:00 with pleomorphic calcifications.\par \par She underwent ultrasound guided core biopsy of the left breast 12:30 on 1/30/19, with pathology showing infiltrating ductal carcinoma, Osito score 6/9 measuring at least 6 mm, with associated DCIS.  ER 95%, VA 95%, HER-2/sarkis negative.  The left breast UOQ calcifications showed fibrocystic changes. \par \par Bilateral breast MRI on 2/1/19 showed linear non-mass enhancement in the upper inner RIGHT breast measuring 6 mm, and the LEFT breast demonstrated residual enhancement in the central left breast at the known area of cancer measuring 2.2 centimeters.  However, a second suspicious enhancing focus in the upper slightly anterior and outer posterior left breast was noted, for which MRI guided biopsy was also recommended. \par \par MRI guided biopsies were performed on 2/6/2019; the right breast upper and inner mid depth demonstrated proliferative fibrocystic changes with moderate ductal epithelia hyperplasia, while the left breast upper outer posterior depth showed LCIS proliferative fibrocystic changes including usual ductal hyperplasia PASH and microcyst formation.  \par \par She underwent left lumpectomy/SLNBx on 3/7/19.  Pathology showed two foci of well-differentiated invasive ductal carcinoma (0.5 cm and 0.3 cm), and DCIS of cribriform and micropapillary type.  A total of 5 left sentinel/axillary lymph nodes were excised, and negative for tumor.  Oncotype DX recurrence score was 11.\par \par She reports recovering well post-operatively, and denies pain, edema, itch, nipple discharge, arm edema fatigue or unintentional weight loss.

## 2019-04-26 ENCOUNTER — APPOINTMENT (OUTPATIENT)
Dept: HEMATOLOGY ONCOLOGY | Facility: CLINIC | Age: 54
End: 2019-04-26

## 2019-04-26 NOTE — HISTORY OF PRESENT ILLNESS
[T: ___] : T[unfilled] [de-identified] : 54 year old female presents today in followup of Dr. Gutierrez's initial consult at [facility] for breast cancer, stage 1  She was seen by radiology oncology and plans are under way for breast RT\par . She was recommended to take Tamoxifen as per Dr. Gutierrez's consult note, with which I fully agree.\par   Maurice  is currently leaning towards not taking the medication, concerned about antiestrogen side effects.  [N: ___] : N[unfilled] [de-identified] : ER/VA +, HER2 neg. [de-identified] : Oncoytpe recurrence score = 11

## 2019-04-26 NOTE — ADDENDUM
[FreeTextEntry1] : Documented by Nidia Faulkner acting as scribe for Dr. Juan Roper on 04/24/2019.\par \par All medical record entries made by the Scribe were at my,Dr. Juan Roper direction and personally dictated by me on 04/24/2019. I have reviewed the chart and agree that the record accurately reflects my personal performance of the history, physical exam, assessment and plan. I have also personally directed, reviewed, and agree with the discharge instructions.\par

## 2019-04-26 NOTE — ASSESSMENT
[FreeTextEntry1] : 54 year old female presents today for a consultation for breast cancer. We discussed treatment options including Tamoxifen and Anastrazole. We discussed the risks and benefits of the medications. Patient prefers to not take Tamoxifen but  has agreed  to follow up with her physicians and certainly to continue screening mammography.

## 2019-04-30 PROCEDURE — 77295 3-D RADIOTHERAPY PLAN: CPT | Mod: 26

## 2019-04-30 PROCEDURE — 77334 RADIATION TREATMENT AID(S): CPT | Mod: 26

## 2019-04-30 PROCEDURE — 77300 RADIATION THERAPY DOSE PLAN: CPT | Mod: 26

## 2019-05-02 PROCEDURE — 77280 THER RAD SIMULAJ FIELD SMPL: CPT | Mod: 26

## 2019-05-03 PROCEDURE — G6017: CPT

## 2019-05-06 ENCOUNTER — OTHER (OUTPATIENT)
Age: 54
End: 2019-05-06

## 2019-05-06 PROCEDURE — G6017: CPT

## 2019-05-07 ENCOUNTER — OTHER (OUTPATIENT)
Age: 54
End: 2019-05-07

## 2019-05-07 PROCEDURE — G6017: CPT

## 2019-05-08 ENCOUNTER — TRANSCRIPTION ENCOUNTER (OUTPATIENT)
Age: 54
End: 2019-05-08

## 2019-05-08 PROCEDURE — G6017: CPT

## 2019-05-09 PROCEDURE — G6017: CPT

## 2019-05-09 PROCEDURE — 77427 RADIATION TX MANAGEMENT X5: CPT

## 2019-05-09 NOTE — REVIEW OF SYSTEMS
[Alopecia: Grade 0] : Alopecia: Grade 0 [Pruritus: Grade 0] : Pruritus: Grade 0 [Skin Hyperpigmentation: Grade 0] : Skin Hyperpigmentation: Grade 0 [Skin Induration: Grade 0] : Skin Induration: Grade 0 [Skin Atrophy: Grade 0] : Skin Atrophy: Grade 0 [Dermatitis Radiation: Grade 0] : Dermatitis Radiation: Grade 0

## 2019-05-10 PROCEDURE — G6017: CPT

## 2019-05-11 NOTE — HISTORY OF PRESENT ILLNESS
[FreeTextEntry1] : Ms. Sanchez is a 54-year-old woman with stage IA, [id number] ductal carcinoma of the left breast. She underwent lumpectomy with negative margins and had a negative sentinel lymph node biopsy. She is currently receiving  \par \par She is feeling generally well. She denies fatigue and pain. She has not noted any skin reactions. She is using a natural oil on the skin.\par

## 2019-05-11 NOTE — DISEASE MANAGEMENT
[Pathological] : TNM Stage: p [IA] : IA [TTNM] : 1b [NTNM] : 0 [MTNM] : 0 [de-identified] : 1325 cGy [de-identified] : 5240 cGy [de-identified] : left breast

## 2019-05-11 NOTE — PHYSICAL EXAM
[Normal] : well developed, well nourished, in no acute distress [de-identified] : Left lumpectomy and axillary scars are well healed, no erythema, no hyperpigmentation.

## 2019-05-13 PROCEDURE — G6017: CPT

## 2019-05-14 PROCEDURE — G6017: CPT

## 2019-05-15 PROCEDURE — G6017: CPT

## 2019-05-16 PROCEDURE — 77427 RADIATION TX MANAGEMENT X5: CPT

## 2019-05-16 PROCEDURE — G6017: CPT

## 2019-05-17 PROCEDURE — G6017: CPT

## 2019-05-20 PROCEDURE — G6017: CPT

## 2019-05-21 ENCOUNTER — APPOINTMENT (OUTPATIENT)
Dept: HEMATOLOGY ONCOLOGY | Facility: CLINIC | Age: 54
End: 2019-05-21

## 2019-05-21 PROCEDURE — G6017: CPT

## 2019-05-21 NOTE — CONSULT LETTER
[Dear  ___] : Dear  [unfilled], [Courtesy Letter:] : I had the pleasure of seeing your patient, [unfilled], in my office today. [Please see my note below.] : Please see my note below. [Sincerely,] : Sincerely, [DrRuthann  ___] : Dr. SPICER [FreeTextEntry3] : Tiff Gutierrez MD\par \par

## 2019-05-21 NOTE — PHYSICAL EXAM
[Fully active, able to carry on all pre-disease performance without restriction] : Status 0 - Fully active, able to carry on all pre-disease performance without restriction [Thin] : thin [Normal] : affect appropriate [de-identified] : Right breast: healed incision lateral margin areola. Left Breast: Healing incision supeior lateral margin areola with retraction and 2 cm seroma. two parallel well healed incision UOQ just superior to areola, Incision left axilla with 1.7 cm seroma. [de-identified] : Abdominoplasty scar [de-identified] : Several tattoos.

## 2019-05-21 NOTE — REASON FOR VISIT
[Initial Consultation] : an initial consultation [Other: _____] : [unfilled] [FreeTextEntry2] : infiltrating ductal carcinoma left breast

## 2019-05-21 NOTE — REVIEW OF SYSTEMS
[Patient Intake Form Reviewed] : Patient intake form was reviewed [FreeTextEntry2] : Does not have flu vaccinations [FreeTextEntry7] : Most recent colonoscopy at least 10 years prior to this consultation [FreeTextEntry8] : Most recent GYN exam approximately 2/2019

## 2019-05-21 NOTE — HISTORY OF PRESENT ILLNESS
[Disease: _____________________] : Disease: [unfilled] [T: ___] : T[unfilled] [N: ___] : N[unfilled] [M: ___] : M[unfilled] [AJCC Stage: ____] : AJCC Stage: [unfilled] [de-identified] : The patient presented in 2019, at age 54, with an abnormal screening mammogram.\par \par Routine screening mammogram performed on 2019 demonstrated heterogeneously dense breasts with architectural distortion in the left superior breast. Diagnostic left tomosynthesis mammogram on 2019 demonstrated a spiculated mass in the left breast with pleomorphic calcifications. Ultrasound on the same date imaged an 11 mm irregular mass with calcifications.\par \par Ultrasound-guided core biopsy of the left breast was performed on 2019.  At the 12:30 position infiltrating ductal carcinoma, Garland score 6/9 measuring at least 6 mm was found along with DCIS. The invasive cancer was ER positive (95%), OR positive (95%), and HER-2/sarkis negative (0-1+). The specimen from the left upper breast with calcifications demonstrated fibrocystic changes.\par \par Breast MRI on 2119 demonstrated extreme fibroglandular tissue with moderate background parenchymal enhancement and innumerable enhancing nonspecific foci. There was a linear non-mass enhancement in the upper inner RIGHT breast measuring 6 mm for which MRI guided biopsy was advised. The LEFT breast demonstrated residual enhancement in the central left breast at the known area of cancer measuring 2.2 centimeters. A second suspicious enhancing focus in the upper slightly anterior and outer posterior left breast was noted, for which MRI guided biopsy was also recommended. \par \par MRI guided biopsy of the right breast on 2019 demonstrated proliferative fibrocystic changes. MRI guided biopsy of the left breast in 2019 also demonstrated proliferative findings and PASH.\par \par Dr. Susan Palleschi performed and a lumpectomy and sentinel lymph node biopsy on 3/7/2019. Final pathology demonstrated multifocal infiltrating ductal carcinoma, Garland score 5/9 measuring 0.5 cm and 0.3 cm. There was also DCIS, cribriform and micropapillary types in 5/25 blocks. The DCIS was low to intermediate nuclear grade without necrosis. Margins were uninvolved with the DCIS was less than 0.1 cm from the margin LCIS was also noted in the specimen.  there were 5 negative sentinel lymph nodes. Oncotype DX recurrence score was 11. \par \par Postoperatively the patient required aspiration of a seroma in the left axilla. She last was seen by Dr. Palleschi on 2019.\par \par Risk factors: \par Prior breast disease: 5 prior biopsies, all benign. Menarche: Age 13. The patient is probably perimenopausal with her LMP in 2019. Her menses became irregular in  and she developed amenorrhea until 2018. She has no vasomotor symptoms. She is  2 para 2002 with her first childbirth at age 26. She breast-fed both her children for approximately one year at each. Oral contraceptive use: Ages 17 -50. Hormone replacement therapy: None. Other hormonal exposure: None. Topical estrogens: None. Family history is negative for cancer the breast, ovary, endometrium, and prostate. There is no personal or family history of colorectal neoplasia. The patient's sister was treated for melanoma at age 24. There is no other history of cancer except for BCC of the skin.The patient is of Ashkenazi Yazidi background. [facility] Breast Cancer STAT plus CHEK2 and HERLINDA were negative (9 genes total) . [de-identified] : Multifocal well-differentiated infiltrating ductal carcinoma, ER positive, SC positive, HER-2/sarkis negative, Oncotype DX 11

## 2019-05-22 PROCEDURE — G6017: CPT

## 2019-05-23 PROCEDURE — 77427 RADIATION TX MANAGEMENT X5: CPT

## 2019-05-23 PROCEDURE — G6017: CPT

## 2019-05-23 PROCEDURE — 77280 THER RAD SIMULAJ FIELD SMPL: CPT | Mod: 26

## 2019-05-24 PROCEDURE — G6017: CPT

## 2019-05-30 NOTE — VITALS
[Maximal Pain Intensity: 0/10] : 0/10 [Least Pain Intensity: 0/10] : 0/10 [ECOG Performance Status: 0 - Fully active, able to carry on all pre-disease performance without restriction] : Performance Status: 0 - Fully active, able to carry on all pre-disease performance without restriction [90: Able to carry normal activity; minor signs or symptoms of disease.] : 90: Able to carry normal activity; minor signs or symptoms of disease.

## 2019-05-30 NOTE — REVIEW OF SYSTEMS
[Alopecia: Grade 0] : Alopecia: Grade 0 [Pruritus: Grade 0] : Pruritus: Grade 0 [Skin Atrophy: Grade 0] : Skin Atrophy: Grade 0 [Skin Induration: Grade 0] : Skin Induration: Grade 0 [Dermatitis Radiation: Grade 1 - Faint erythema or dry desquamation] : Dermatitis Radiation: Grade 1 - Faint erythema or dry desquamation [Skin Hyperpigmentation: Grade 1 - Hyperpigmentation covering <10% BSA; no psychosocial impact] : Skin Hyperpigmentation: Grade 1 - Hyperpigmentation covering <10% BSA; no psychosocial impact

## 2019-05-31 PROCEDURE — 77427 RADIATION TX MANAGEMENT X5: CPT

## 2019-06-06 NOTE — HISTORY OF PRESENT ILLNESS
[FreeTextEntry1] : Ms. 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 She underwent lumpectomy with negative margins and had a negative sentinel lymph node biopsy. She is currently receiving adjuvant radiation therapy. \par \par She is feeling generally well. 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 She has some breast discomfort but denies pain. She has red skin reaction and is using a natural oil on her breast.

## 2019-06-06 NOTE — HISTORY OF PRESENT ILLNESS
[FreeTextEntry1] : Ms. Sanchez is a 54-year-old woman with stage IA, [id number] ductal carcinoma of the left breast. She underwent lumpectomy with negative margins and had a negative sentinel lymph node biopsy. She is currently receiving adjuvant radiation therapy.   \par \par She is feeling generally well. She denies fatigue and pain. She has a mild skin reaction she is using a natural oil on her breast

## 2019-06-06 NOTE — PHYSICAL EXAM
[de-identified] : Left lumpectomy and axillary scars are well healed, no erythema, no hyperpigmentation.

## 2019-06-06 NOTE — PHYSICAL EXAM
[Normal] : well developed, well nourished, in no acute distress [de-identified] : Left lumpectomy and axillary scars are well healed, mild erythema and hyperpigmentation.

## 2019-06-06 NOTE — DISEASE MANAGEMENT
[Pathological] : TNM Stage: p [IA] : IA [MTNM] : 0 [TTNM] : 1b [NTNM] : 0 [de-identified] : 4990 cGy [de-identified] : 5240 cGy [de-identified] : left breast

## 2019-06-06 NOTE — HISTORY OF PRESENT ILLNESS
[FreeTextEntry1] : Ms. Sanchez is a 54-year-old woman with stage IA, [id number] ductal carcinoma of the left breast. She underwent lumpectomy with negative margins and had a negative sentinel lymph node biopsy. She is currently  receiving adjuvant radiation therapy. \par \par She is feeling generally well. She denies fatigue and pain. She has not noted any skin reactions. She is using a natural oil on the skin. \par

## 2019-06-06 NOTE — DISEASE MANAGEMENT
[TTNM] : 1b [NTNM] : 0 [MTNM] : 0 [de-identified] : 3145 cGy [de-identified] : 5240 cGy [de-identified] : left breast

## 2019-06-06 NOTE — DISEASE MANAGEMENT
[Pathological] : TNM Stage: p [IA] : IA [TTNM] : 1b [NTNM] : 0 [de-identified] : 3710 cGy [MTNM] : 0 [de-identified] : 5240 cGy [de-identified] : left breast

## 2019-06-06 NOTE — PHYSICAL EXAM
[Normal] : well developed, well nourished, in no acute distress [de-identified] : Left lumpectomy and axillary scars are well healed, mild erythema and hyperpigmentation.

## 2019-07-02 ENCOUNTER — APPOINTMENT (OUTPATIENT)
Dept: RADIATION ONCOLOGY | Facility: CLINIC | Age: 54
End: 2019-07-02

## 2019-07-02 VITALS
DIASTOLIC BLOOD PRESSURE: 77 MMHG | SYSTOLIC BLOOD PRESSURE: 124 MMHG | HEART RATE: 72 BPM | RESPIRATION RATE: 16 BRPM | TEMPERATURE: 97.88 F | WEIGHT: 119.38 LBS | OXYGEN SATURATION: 74 % | BODY MASS INDEX: 18.7 KG/M2

## 2019-07-02 RX ORDER — FLAXSEED
POWDER (GRAM) ORAL
Refills: 0 | Status: DISCONTINUED | COMMUNITY
Start: 2019-04-08 | End: 2019-07-02

## 2019-07-02 NOTE — REVIEW OF SYSTEMS
[Negative] : Allergic/Immunologic [Alopecia: Grade 0] : Alopecia: Grade 0 [Pruritus: Grade 0] : Pruritus: Grade 0 [Skin Hyperpigmentation: Grade 1 - Hyperpigmentation covering <10% BSA; no psychosocial impact] : Skin Hyperpigmentation: Grade 1 - Hyperpigmentation covering <10% BSA; no psychosocial impact [Skin Atrophy: Grade 0] : Skin Atrophy: Grade 0 [Dermatitis Radiation: Grade 0] : Dermatitis Radiation: Grade 0 [Skin Induration: Grade 0] : Skin Induration: Grade 0

## 2019-07-02 NOTE — PHYSICAL EXAM
[General Appearance - Well Developed] : well developed [Breast Abnormal Lactation (Galactorrhea)] : no nipple discharge [Sclera] : the sclera and conjunctiva were normal [] : no respiratory distress [Heart Rate And Rhythm] : heart rate and rhythm were normal [Supraclavicular Lymph Nodes Enlarged Bilaterally] : supraclavicular [No UE Edema] : there is no upper extremity edema [Normal] : no palpable adenopathy [Axillary Lymph Nodes Enlarged Bilaterally] : axillary [de-identified] : Right lumpectomy and axillary scars are well healed, no erythema, mild residual hyperpigmentation.

## 2019-07-02 NOTE — HISTORY OF PRESENT ILLNESS
[FreeTextEntry1] : Ms. Sanchez is a 54-year-old woman with stage IA, [id number] ductal carcinoma of the left breast. She underwent lumpectomy with negative margins and had a negative sentinel lymph node biopsy. She underwent adjuvant radiation therapy to the left breast. She received a dose of 4240cGy followed by a 1000cGy boost using hypofractionation to the breast, completed on 5/31/19. She returns today for the first post-treatment evaluation.\par \par The patient reports feeling generally well. 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 She has occasional fatigue but it seems to be improving. She denies cough or shortness of breath.  There has been some improvement in the skin since completing radiation therapy. She continues regular skin care. The breast is still sensitive, especially after activity. She does not require analgesics. She has been going to [facility] for therapy. She started anastrozole and is tolerating it well. She is thinking of not taking endocrine therapy. She has a follow up with Dr. Roper next week.

## 2019-07-03 ENCOUNTER — OUTPATIENT (OUTPATIENT)
Dept: OUTPATIENT SERVICES | Facility: HOSPITAL | Age: 54
LOS: 1 days | Discharge: HOME | End: 2019-07-03

## 2019-07-03 DIAGNOSIS — Z98.890 OTHER SPECIFIED POSTPROCEDURAL STATES: Chronic | ICD-10-CM

## 2019-07-03 DIAGNOSIS — C50.919 MALIGNANT NEOPLASM OF UNSPECIFIED SITE OF UNSPECIFIED FEMALE BREAST: ICD-10-CM

## 2019-07-08 ENCOUNTER — RESULT REVIEW (OUTPATIENT)
Age: 54
End: 2019-07-08

## 2019-07-08 ENCOUNTER — APPOINTMENT (OUTPATIENT)
Dept: HEMATOLOGY ONCOLOGY | Facility: CLINIC | Age: 54
End: 2019-07-08
Payer: COMMERCIAL

## 2019-07-08 VITALS
SYSTOLIC BLOOD PRESSURE: 127 MMHG | HEIGHT: 67 IN | HEART RATE: 64 BPM | WEIGHT: 118.04 LBS | BODY MASS INDEX: 18.53 KG/M2 | TEMPERATURE: 207.68 F | DIASTOLIC BLOOD PRESSURE: 87 MMHG | OXYGEN SATURATION: 97 %

## 2019-07-08 LAB
BASOPHILS # BLD AUTO: 0 K/UL — SIGNIFICANT CHANGE UP (ref 0–0.2)
BASOPHILS NFR BLD AUTO: 0.8 % — SIGNIFICANT CHANGE UP (ref 0–2)
EOSINOPHIL # BLD AUTO: 0 K/UL — SIGNIFICANT CHANGE UP (ref 0–0.5)
EOSINOPHIL NFR BLD AUTO: 0.7 % — SIGNIFICANT CHANGE UP (ref 0–6)
HCT VFR BLD CALC: 41.4 % — SIGNIFICANT CHANGE UP (ref 34.5–45)
HGB BLD-MCNC: 13.9 G/DL — SIGNIFICANT CHANGE UP (ref 11.5–15.5)
LYMPHOCYTES # BLD AUTO: 1.2 K/UL — SIGNIFICANT CHANGE UP (ref 1–3.3)
LYMPHOCYTES # BLD AUTO: 23.7 % — SIGNIFICANT CHANGE UP (ref 13–44)
MCHC RBC-ENTMCNC: 31.2 PG — SIGNIFICANT CHANGE UP (ref 27–34)
MCHC RBC-ENTMCNC: 33.5 G/DL — SIGNIFICANT CHANGE UP (ref 32–36)
MCV RBC AUTO: 93.3 FL — SIGNIFICANT CHANGE UP (ref 80–100)
MONOCYTES # BLD AUTO: 0.3 K/UL — SIGNIFICANT CHANGE UP (ref 0–0.9)
MONOCYTES NFR BLD AUTO: 6.1 % — SIGNIFICANT CHANGE UP (ref 2–14)
NEUTROPHILS # BLD AUTO: 3.4 K/UL — SIGNIFICANT CHANGE UP (ref 1.8–7.4)
NEUTROPHILS NFR BLD AUTO: 68.6 % — SIGNIFICANT CHANGE UP (ref 43–77)
PLATELET # BLD AUTO: 290 K/UL — SIGNIFICANT CHANGE UP (ref 150–400)
RBC # BLD: 4.44 M/UL — SIGNIFICANT CHANGE UP (ref 3.8–5.2)
RBC # FLD: 11.4 % — SIGNIFICANT CHANGE UP (ref 10.3–14.5)
WBC # BLD: 4.9 K/UL — SIGNIFICANT CHANGE UP (ref 3.8–10.5)
WBC # FLD AUTO: 4.9 K/UL — SIGNIFICANT CHANGE UP (ref 3.8–10.5)

## 2019-07-08 PROCEDURE — 99214 OFFICE O/P EST MOD 30 MIN: CPT

## 2019-07-09 LAB
ALBUMIN SERPL ELPH-MCNC: 4.9 G/DL
ALP BLD-CCNC: 63 U/L
ALT SERPL-CCNC: 14 U/L
ANION GAP SERPL CALC-SCNC: 13 MMOL/L
AST SERPL-CCNC: 22 U/L
BILIRUB SERPL-MCNC: 0.2 MG/DL
BUN SERPL-MCNC: 6 MG/DL
CALCIUM SERPL-MCNC: 9.8 MG/DL
CHLORIDE SERPL-SCNC: 103 MMOL/L
CO2 SERPL-SCNC: 27 MMOL/L
CREAT SERPL-MCNC: 0.82 MG/DL
GLUCOSE SERPL-MCNC: 74 MG/DL
POTASSIUM SERPL-SCNC: 4.9 MMOL/L
PROT SERPL-MCNC: 6.8 G/DL
SODIUM SERPL-SCNC: 143 MMOL/L

## 2019-07-11 NOTE — HISTORY OF PRESENT ILLNESS
[T: ___] : T[unfilled] [N: ___] : N[unfilled] [de-identified] : Maurice aSnchez is a 54-year-old female who presents today in follow-up for breast cancer, stage 1. She was recommended to take Tamoxifen as per Dr. Gutierrez's consult note, with which I fully agree, but has opted out of starting hormone replacement therapy. \par  [de-identified] : Oncoytpe recurrence score = 11 [de-identified] : ER/[place] +, HER2 neg. [de-identified] : Patient has opted against hormone replacement therapy and states she will not be taking Tamoxifen. \par Feels well overall with no new complaints.

## 2019-07-11 NOTE — ADDENDUM
[FreeTextEntry1] : I, Maxi Cabrera, acted solely as scribe for Dr. Juan Roper MD on this date 07/08/2019  2:30PM .\par \par All medical record entries made by the Scribe were at my, Dr. Juan Roper MD direction and personally dictated by me on 07/08/2019  2:30PM. I have reviewed the chart and agree that the record accurately reflects my personal performance of the history, physical exam, assessment and plan. I have also personally directed, reviewed and agreed with the chart.\par

## 2019-07-11 NOTE — ASSESSMENT
[FreeTextEntry1] : 54 year old female presents today for a follow up for breast cancer. Patient has opted not to take Tamoxifen.\par \par Plan:\par Continue observation; Follow up in 1 year

## 2019-09-05 ENCOUNTER — OUTPATIENT (OUTPATIENT)
Dept: OUTPATIENT SERVICES | Facility: HOSPITAL | Age: 54
LOS: 1 days | End: 2019-09-05
Payer: COMMERCIAL

## 2019-09-05 ENCOUNTER — APPOINTMENT (OUTPATIENT)
Dept: MRI IMAGING | Facility: IMAGING CENTER | Age: 54
End: 2019-09-05
Payer: COMMERCIAL

## 2019-09-05 DIAGNOSIS — Z98.890 OTHER SPECIFIED POSTPROCEDURAL STATES: Chronic | ICD-10-CM

## 2019-09-05 DIAGNOSIS — C50.812 MALIGNANT NEOPLASM OF OVERLAPPING SITES OF LEFT FEMALE BREAST: ICD-10-CM

## 2019-09-05 DIAGNOSIS — R92.8 OTHER ABNORMAL AND INCONCLUSIVE FINDINGS ON DIAGNOSTIC IMAGING OF BREAST: ICD-10-CM

## 2019-09-05 PROCEDURE — A9585: CPT

## 2019-09-05 PROCEDURE — C8908: CPT

## 2019-09-05 PROCEDURE — 77049 MRI BREAST C-+ W/CAD BI: CPT | Mod: 26

## 2019-09-05 PROCEDURE — C8937: CPT

## 2019-09-12 ENCOUNTER — APPOINTMENT (OUTPATIENT)
Dept: CT IMAGING | Facility: CLINIC | Age: 54
End: 2019-09-12
Payer: COMMERCIAL

## 2019-09-12 ENCOUNTER — OUTPATIENT (OUTPATIENT)
Dept: OUTPATIENT SERVICES | Facility: HOSPITAL | Age: 54
LOS: 1 days | End: 2019-09-12
Payer: COMMERCIAL

## 2019-09-12 DIAGNOSIS — R92.8 OTHER ABNORMAL AND INCONCLUSIVE FINDINGS ON DIAGNOSTIC IMAGING OF BREAST: ICD-10-CM

## 2019-09-12 DIAGNOSIS — Z98.890 OTHER SPECIFIED POSTPROCEDURAL STATES: Chronic | ICD-10-CM

## 2019-09-12 PROCEDURE — 71250 CT THORAX DX C-: CPT | Mod: 26

## 2019-09-12 PROCEDURE — 71250 CT THORAX DX C-: CPT

## 2019-10-01 ENCOUNTER — APPOINTMENT (OUTPATIENT)
Dept: ULTRASOUND IMAGING | Facility: CLINIC | Age: 54
End: 2019-10-01
Payer: COMMERCIAL

## 2019-10-01 ENCOUNTER — OUTPATIENT (OUTPATIENT)
Dept: OUTPATIENT SERVICES | Facility: HOSPITAL | Age: 54
LOS: 1 days | End: 2019-10-01
Payer: COMMERCIAL

## 2019-10-01 ENCOUNTER — APPOINTMENT (OUTPATIENT)
Dept: MAMMOGRAPHY | Facility: CLINIC | Age: 54
End: 2019-10-01
Payer: COMMERCIAL

## 2019-10-01 DIAGNOSIS — Z98.890 OTHER SPECIFIED POSTPROCEDURAL STATES: Chronic | ICD-10-CM

## 2019-10-01 DIAGNOSIS — Z00.8 ENCOUNTER FOR OTHER GENERAL EXAMINATION: ICD-10-CM

## 2019-10-01 PROCEDURE — 77065 DX MAMMO INCL CAD UNI: CPT | Mod: 26,LT

## 2019-10-01 PROCEDURE — 77065 DX MAMMO INCL CAD UNI: CPT

## 2019-10-01 PROCEDURE — 76641 ULTRASOUND BREAST COMPLETE: CPT | Mod: 26,LT

## 2019-10-01 PROCEDURE — G0279: CPT | Mod: 26

## 2019-10-01 PROCEDURE — 76641 ULTRASOUND BREAST COMPLETE: CPT

## 2019-10-01 PROCEDURE — G0279: CPT

## 2019-12-02 ENCOUNTER — EMERGENCY (EMERGENCY)
Facility: HOSPITAL | Age: 54
LOS: 0 days | Discharge: HOME | End: 2019-12-03
Attending: EMERGENCY MEDICINE
Payer: COMMERCIAL

## 2019-12-02 VITALS
SYSTOLIC BLOOD PRESSURE: 145 MMHG | TEMPERATURE: 98 F | RESPIRATION RATE: 18 BRPM | HEART RATE: 86 BPM | OXYGEN SATURATION: 98 % | DIASTOLIC BLOOD PRESSURE: 92 MMHG
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
## 2019-12-02 DIAGNOSIS — Z98.890 OTHER SPECIFIED POSTPROCEDURAL STATES: Chronic | ICD-10-CM

## 2019-12-02 DIAGNOSIS — T78.40XA ALLERGY, UNSPECIFIED, INITIAL ENCOUNTER: ICD-10-CM
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
## 2019-12-02 DIAGNOSIS — J32.9 CHRONIC SINUSITIS, UNSPECIFIED: ICD-10-CM

## 2019-12-02 PROCEDURE — 99283 EMERGENCY DEPT VISIT LOW MDM: CPT

## 2019-12-02 PROCEDURE — 99284 EMERGENCY DEPT VISIT MOD MDM: CPT

## 2019-12-02 RX ORDER — FAMOTIDINE 10 MG/ML
20 INJECTION INTRAVENOUS ONCE
Refills: 0 | Status: COMPLETED | OUTPATIENT
Start: 2019-12-02 | End: 2019-12-02

## 2019-12-02 RX ADMIN — FAMOTIDINE 20 MILLIGRAM(S): 10 INJECTION INTRAVENOUS at 23:50

## 2019-12-02 RX ADMIN — Medication 60 MILLIGRAM(S): at 23:50

## 2019-12-02 NOTE — ED ADULT TRIAGE NOTE - CHIEF COMPLAINT QUOTE
Pt presents to ER c/o allergic reaction; swelling of mouth and lower part of face. Onset of symptoms began 3 hours PTA. Pt began new abx today for sinus infection. Pt took Benadryl 50mg 45 minutes PTA. Denies SOB

## 2019-12-03 VITALS
DIASTOLIC BLOOD PRESSURE: 87 MMHG | RESPIRATION RATE: 17 BRPM | SYSTOLIC BLOOD PRESSURE: 133 MMHG | HEART RATE: 80 BPM | OXYGEN SATURATION: 99 % | TEMPERATURE: 98 F

## 2019-12-03 RX ORDER — FAMOTIDINE 10 MG/ML
1 INJECTION INTRAVENOUS
Qty: 10 | Refills: 0
Start: 2019-12-03 | End: 2019-12-07

## 2019-12-03 NOTE — ED ADULT NURSE NOTE - NSIMPLEMENTINTERV_GEN_ALL_ED
Implemented All Universal Safety Interventions:  [place] to call system. Call bell, personal items and telephone within reach. Instruct patient to call for assistance. Room bathroom lighting operational. Non-slip footwear when patient is off stretcher. Physically safe environment: no spills, clutter or unnecessary equipment. Stretcher in lowest position, wheels locked, appropriate side rails in place.

## 2019-12-03 NOTE — ED PROVIDER NOTE - NSFOLLOWUPINSTRUCTIONS_ED_ALL_ED_FT
Please call and follow up with your doctor in 1-3 days. Please stop your antibiotic (augmentin) Please call and follow up with your ENT tomorrow.  Take prednisone 60 mg daily for 4 days  Pepcid 20 mg daily for 5 days  benadryl 50 mg as need for redness, itching, swelling every 4 hours.    General Allergic Reaction    WHAT YOU NEED TO KNOW:    An allergic reaction is your body's response to an allergen. Allergens include medicines, food, insect stings, animal dander, mold, latex, chemicals, and dust mites. Pollen from trees, grass, and weeds can also cause an allergic reaction. An allergic reaction can range from mild to severe.    DISCHARGE INSTRUCTIONS:    Call 911 for signs or symptoms of anaphylaxis, such as trouble breathing, swelling in your mouth or throat, or wheezing. You may also have itching, a rash, hives, or feel like you are going to faint.    Return to the emergency department if:     You have a skin rash, hives, swelling, or itching that is starting to get worse.      Your throat tightens, or your lips or tongue swell.      You have trouble swallowing or speaking.      You have worsening nausea, diarrhea, or abdominal cramps, or you are vomiting.      You have chest pain or tightness.    Contact your healthcare provider if:     You have questions or concerns about your condition or care.        Medicines: You may need any of the following:     Medicines may be given to relieve certain allergy symptoms such as itching, sneezing, and swelling. You may take them as a pill or use drops in your nose or eyes. Topical treatments may be given to put directly on your skin to help decrease itching or swelling.      Epinephrine may be prescribed if you are at risk for anaphylaxis. This is a severe allergic reaction that can be life-threatening. Your healthcare provider will tell you if you need to keep epinephrine with you. You will be taught when and how to use it.      Take your medicine as directed. Contact your healthcare provider if you think your medicine is not helping or if you have side effects. Tell him of her if you are allergic to any medicine. Keep a list of the medicines, vitamins, and herbs you take. Include the amounts, and when and why you take them. Bring the list or the pill bottles to follow-up visits. Carry your medicine list with you in case of an emergency.    Follow up with your healthcare provider as directed: Write down your questions so you remember to ask them during your visits.     Manage your symptoms:     Avoid allergens. You may need to have allergy testing with your healthcare provider or a specialist to find your allergens.      Use cold compresses on your skin or eyes. This will help soothe skin or eyes affected by the allergic reaction. You can make a cold compress by soaking a washcloth in cool water. Wring out the extra water before you apply the washcloth.      Rinse your nasal passages with a saline solution. Daily rinsing may help clear allergens out of your nose. Use distilled water if possible. You can also boil tap water and then let it cool before you use it. Do not use tap water without boiling it first.      Do not smoke. Nicotine and other chemicals in cigarettes and cigars can make an allergic reaction worse, and can also cause lung damage. Ask your healthcare provider for information if you currently smoke and need help to quit. E-cigarettes or smokeless tobacco still contain nicotine. Talk to your healthcare provider before you use these products.

## 2019-12-03 NOTE — ED PROVIDER NOTE - OBJECTIVE STATEMENT
55 yo pt presents to ED with boyfriend for swelling lips.  pt saw ENT today and diagnosed with sinusitis and started on Augmentin.  pt took first dose at noon.  At 6 pm pt started to notice swelling of lips.  Pt took 2 benadryl and swelling did not improved and came to ED.  No difficult breathing, no drooling.  No rash,  No n/v/d.  No travel, no sick contact.

## 2019-12-03 NOTE — ED PROVIDER NOTE - PATIENT PORTAL LINK FT
You can access the FollowMyHealth Patient Portal offered by [facility] by registering at the following website: [email or web address]. By joining [employer]’s FollowMyHealth portal, you will also be able to view your health information using other applications (apps) compatible with our system.

## 2019-12-03 NOTE — ED PROVIDER NOTE - ENMT, MLM
Airway patent, Nasal mucosa clear. Mouth with normal mucosa. Throat has no vesicles, no oropharyngeal exudates and uvula is midline.  + swelling of lips, tongue normal size

## 2019-12-03 NOTE — ED PROVIDER NOTE - PROGRESS NOTE DETAILS
Attending Barrera, pt states no change in swelling level.  On exam there does appear to be some furrowing of lips.  Plan d/c and stop abx and follow up with doctors outpt.

## 2020-02-12 ENCOUNTER — APPOINTMENT (OUTPATIENT)
Dept: ULTRASOUND IMAGING | Facility: CLINIC | Age: 55
End: 2020-02-12
Payer: COMMERCIAL

## 2020-02-12 ENCOUNTER — APPOINTMENT (OUTPATIENT)
Dept: MAMMOGRAPHY | Facility: CLINIC | Age: 55
End: 2020-02-12
Payer: COMMERCIAL

## 2020-02-12 ENCOUNTER — OUTPATIENT (OUTPATIENT)
Dept: OUTPATIENT SERVICES | Facility: HOSPITAL | Age: 55
LOS: 1 days | End: 2020-02-12
Payer: COMMERCIAL

## 2020-02-12 DIAGNOSIS — Z00.8 ENCOUNTER FOR OTHER GENERAL EXAMINATION: ICD-10-CM

## 2020-02-12 DIAGNOSIS — Z98.890 OTHER SPECIFIED POSTPROCEDURAL STATES: Chronic | ICD-10-CM

## 2020-02-12 PROCEDURE — 76641 ULTRASOUND BREAST COMPLETE: CPT

## 2020-02-12 PROCEDURE — G0279: CPT | Mod: 26

## 2020-02-12 PROCEDURE — 77066 DX MAMMO INCL CAD BI: CPT | Mod: 26

## 2020-02-12 PROCEDURE — G0279: CPT

## 2020-02-12 PROCEDURE — 76641 ULTRASOUND BREAST COMPLETE: CPT | Mod: 26,50

## 2020-02-12 PROCEDURE — 77066 DX MAMMO INCL CAD BI: CPT

## 2020-07-06 ENCOUNTER — RESULT REVIEW (OUTPATIENT)
Age: 55
End: 2020-07-06

## 2020-10-12 ENCOUNTER — OUTPATIENT (OUTPATIENT)
Dept: OUTPATIENT SERVICES | Facility: HOSPITAL | Age: 55
LOS: 1 days | Discharge: HOME | End: 2020-10-12

## 2020-10-12 DIAGNOSIS — C50.919 MALIGNANT NEOPLASM OF UNSPECIFIED SITE OF UNSPECIFIED FEMALE BREAST: ICD-10-CM

## 2020-10-12 DIAGNOSIS — Z98.890 OTHER SPECIFIED POSTPROCEDURAL STATES: Chronic | ICD-10-CM

## 2020-11-13 ENCOUNTER — OUTPATIENT (OUTPATIENT)
Dept: OUTPATIENT SERVICES | Facility: HOSPITAL | Age: 55
LOS: 1 days | Discharge: HOME | End: 2020-11-13

## 2020-11-13 DIAGNOSIS — C50.919 MALIGNANT NEOPLASM OF UNSPECIFIED SITE OF UNSPECIFIED FEMALE BREAST: ICD-10-CM

## 2020-11-13 DIAGNOSIS — Z98.890 OTHER SPECIFIED POSTPROCEDURAL STATES: Chronic | ICD-10-CM

## 2020-11-18 ENCOUNTER — RESULT REVIEW (OUTPATIENT)
Age: 55
End: 2020-11-18

## 2020-11-18 ENCOUNTER — APPOINTMENT (OUTPATIENT)
Dept: HEMATOLOGY ONCOLOGY | Facility: CLINIC | Age: 55
End: 2020-11-18
Payer: COMMERCIAL

## 2020-11-18 VITALS
SYSTOLIC BLOOD PRESSURE: 131 MMHG | HEART RATE: 64 BPM | BODY MASS INDEX: 19.16 KG/M2 | HEIGHT: 67 IN | DIASTOLIC BLOOD PRESSURE: 79 MMHG | OXYGEN SATURATION: 99 % | WEIGHT: 122.04 LBS

## 2020-11-18 LAB
BASOPHILS # BLD AUTO: 0 K/UL — SIGNIFICANT CHANGE UP (ref 0–0.2)
BASOPHILS NFR BLD AUTO: 0.8 % — SIGNIFICANT CHANGE UP (ref 0–2)
EOSINOPHIL # BLD AUTO: 0.1 K/UL — SIGNIFICANT CHANGE UP (ref 0–0.5)
EOSINOPHIL NFR BLD AUTO: 1.1 % — SIGNIFICANT CHANGE UP (ref 0–6)
HCT VFR BLD CALC: 41.2 % — SIGNIFICANT CHANGE UP (ref 34.5–45)
HGB BLD-MCNC: 13.5 G/DL — SIGNIFICANT CHANGE UP (ref 11.5–15.5)
LYMPHOCYTES # BLD AUTO: 1.6 K/UL — SIGNIFICANT CHANGE UP (ref 1–3.3)
LYMPHOCYTES # BLD AUTO: 34.2 % — SIGNIFICANT CHANGE UP (ref 13–44)
MCHC RBC-ENTMCNC: 31.7 PG — SIGNIFICANT CHANGE UP (ref 27–34)
MCHC RBC-ENTMCNC: 32.6 G/DL — SIGNIFICANT CHANGE UP (ref 32–36)
MCV RBC AUTO: 97.1 FL — SIGNIFICANT CHANGE UP (ref 80–100)
MONOCYTES # BLD AUTO: 0.4 K/UL — SIGNIFICANT CHANGE UP (ref 0–0.9)
MONOCYTES NFR BLD AUTO: 8.9 % — SIGNIFICANT CHANGE UP (ref 2–14)
NEUTROPHILS # BLD AUTO: 2.6 K/UL — SIGNIFICANT CHANGE UP (ref 1.8–7.4)
NEUTROPHILS NFR BLD AUTO: 55 % — SIGNIFICANT CHANGE UP (ref 43–77)
PLATELET # BLD AUTO: 295 K/UL — SIGNIFICANT CHANGE UP (ref 150–400)
RBC # BLD: 4.25 M/UL — SIGNIFICANT CHANGE UP (ref 3.8–5.2)
RBC # FLD: 11.4 % — SIGNIFICANT CHANGE UP (ref 10.3–14.5)
WBC # BLD: 4.8 K/UL — SIGNIFICANT CHANGE UP (ref 3.8–10.5)
WBC # FLD AUTO: 4.8 K/UL — SIGNIFICANT CHANGE UP (ref 3.8–10.5)

## 2020-11-18 PROCEDURE — 99212 OFFICE O/P EST SF 10 MIN: CPT

## 2020-11-18 NOTE — ASSESSMENT
[FreeTextEntry1] : \par Breast cancer, stage 1, left (174.9) (C50.912)\par \par 55 year old female presents today for a follow up for breast cancer. Patient has opted not to take Tamoxifen.\par We have agreed that Maurice will be followed by her primary care physician and will be available to see her in the future should the need arise.\par \par

## 2020-11-18 NOTE — HISTORY OF PRESENT ILLNESS
[de-identified] : \par Maurice Sanchez is a 55-year-old female who presents today in follow-up for breast cancer, stage 1. She was recommended to take Tamoxifen as per Dr. Gutierrez's consult note, with which I fully agree, but has opted out of starting hormone replacement therapy. \par  \par \par TNM stage: T1b, N0 \par Tumor Markers: Oncoytpe recurrence score = 11 \par \par ER/AR +, HER2 neg. \par \par Interval History: Patient has opted against hormone replacement therapy and states she will not be taking Tamoxifen. \par Feels well overall with no new complaints. \par \par  [de-identified] : Doing very well with no complaints offered today.

## 2020-11-19 LAB
ALBUMIN SERPL ELPH-MCNC: 4.9 G/DL
ALP BLD-CCNC: 75 U/L
ALT SERPL-CCNC: 24 U/L
ANION GAP SERPL CALC-SCNC: 11 MMOL/L
AST SERPL-CCNC: 32 U/L
BILIRUB SERPL-MCNC: 0.2 MG/DL
BUN SERPL-MCNC: 7 MG/DL
CALCIUM SERPL-MCNC: 10.2 MG/DL
CHLORIDE SERPL-SCNC: 99 MMOL/L
CO2 SERPL-SCNC: 29 MMOL/L
CREAT SERPL-MCNC: 0.8 MG/DL
GLUCOSE SERPL-MCNC: 99 MG/DL
POTASSIUM SERPL-SCNC: 4.3 MMOL/L
PROT SERPL-MCNC: 6.7 G/DL
SODIUM SERPL-SCNC: 139 MMOL/L

## 2021-03-17 ENCOUNTER — APPOINTMENT (OUTPATIENT)
Dept: MAMMOGRAPHY | Facility: CLINIC | Age: 56
End: 2021-03-17
Payer: COMMERCIAL

## 2021-03-17 ENCOUNTER — APPOINTMENT (OUTPATIENT)
Dept: ULTRASOUND IMAGING | Facility: CLINIC | Age: 56
End: 2021-03-17
Payer: COMMERCIAL

## 2021-03-17 ENCOUNTER — OUTPATIENT (OUTPATIENT)
Dept: OUTPATIENT SERVICES | Facility: HOSPITAL | Age: 56
LOS: 1 days | End: 2021-03-17
Payer: COMMERCIAL

## 2021-03-17 DIAGNOSIS — Z00.8 ENCOUNTER FOR OTHER GENERAL EXAMINATION: ICD-10-CM

## 2021-03-17 DIAGNOSIS — Z98.890 OTHER SPECIFIED POSTPROCEDURAL STATES: Chronic | ICD-10-CM

## 2021-03-17 DIAGNOSIS — C50.812 MALIGNANT NEOPLASM OF OVERLAPPING SITES OF LEFT FEMALE BREAST: ICD-10-CM

## 2021-03-17 PROCEDURE — G0279: CPT | Mod: 26

## 2021-03-17 PROCEDURE — 76641 ULTRASOUND BREAST COMPLETE: CPT | Mod: 26,50

## 2021-03-17 PROCEDURE — 76641 ULTRASOUND BREAST COMPLETE: CPT

## 2021-03-17 PROCEDURE — 77066 DX MAMMO INCL CAD BI: CPT | Mod: 26

## 2021-03-17 PROCEDURE — G0279: CPT

## 2021-03-17 PROCEDURE — 77066 DX MAMMO INCL CAD BI: CPT

## 2021-03-26 ENCOUNTER — OUTPATIENT (OUTPATIENT)
Dept: OUTPATIENT SERVICES | Facility: HOSPITAL | Age: 56
LOS: 1 days | End: 2021-03-26
Payer: COMMERCIAL

## 2021-03-26 ENCOUNTER — RESULT REVIEW (OUTPATIENT)
Age: 56
End: 2021-03-26

## 2021-03-26 ENCOUNTER — APPOINTMENT (OUTPATIENT)
Dept: MAMMOGRAPHY | Facility: CLINIC | Age: 56
End: 2021-03-26
Payer: COMMERCIAL

## 2021-03-26 DIAGNOSIS — C50.812 MALIGNANT NEOPLASM OF OVERLAPPING SITES OF LEFT FEMALE BREAST: ICD-10-CM

## 2021-03-26 DIAGNOSIS — Z00.8 ENCOUNTER FOR OTHER GENERAL EXAMINATION: ICD-10-CM

## 2021-03-26 DIAGNOSIS — Z98.890 OTHER SPECIFIED POSTPROCEDURAL STATES: Chronic | ICD-10-CM

## 2021-03-26 PROCEDURE — 19081 BX BREAST 1ST LESION STRTCTC: CPT | Mod: RT

## 2021-03-26 PROCEDURE — 88305 TISSUE EXAM BY PATHOLOGIST: CPT | Mod: 26

## 2021-03-26 PROCEDURE — 77065 DX MAMMO INCL CAD UNI: CPT | Mod: 26,RT

## 2021-03-26 PROCEDURE — 19081 BX BREAST 1ST LESION STRTCTC: CPT

## 2021-03-26 PROCEDURE — 88305 TISSUE EXAM BY PATHOLOGIST: CPT

## 2021-03-26 PROCEDURE — A4648: CPT

## 2021-03-26 PROCEDURE — 77065 DX MAMMO INCL CAD UNI: CPT

## 2021-07-26 ENCOUNTER — NON-APPOINTMENT (OUTPATIENT)
Age: 56
End: 2021-07-26

## 2021-07-26 ENCOUNTER — APPOINTMENT (OUTPATIENT)
Dept: OBGYN | Facility: CLINIC | Age: 56
End: 2021-07-26
Payer: COMMERCIAL

## 2021-07-26 ENCOUNTER — RESULT CHARGE (OUTPATIENT)
Age: 56
End: 2021-07-26

## 2021-07-26 ENCOUNTER — ASOB RESULT (OUTPATIENT)
Age: 56
End: 2021-07-26

## 2021-07-26 VITALS
SYSTOLIC BLOOD PRESSURE: 110 MMHG | HEIGHT: 67 IN | BODY MASS INDEX: 19.46 KG/M2 | DIASTOLIC BLOOD PRESSURE: 80 MMHG | WEIGHT: 124 LBS

## 2021-07-26 LAB
BILIRUB UR QL STRIP: NORMAL
COLLECTION METHOD: NORMAL
GLUCOSE UR-MCNC: NORMAL
HCG UR QL: 0.2 EU/DL
HGB UR QL STRIP.AUTO: NORMAL
KETONES UR-MCNC: NORMAL
LEUKOCYTE ESTERASE UR QL STRIP: NORMAL
NITRITE UR QL STRIP: NORMAL
PH UR STRIP: 7
PROT UR STRIP-MCNC: NORMAL
SP GR UR STRIP: 1.01

## 2021-07-26 PROCEDURE — 81003 URINALYSIS AUTO W/O SCOPE: CPT | Mod: QW

## 2021-07-26 PROCEDURE — 58100 BIOPSY OF UTERUS LINING: CPT

## 2021-07-26 PROCEDURE — 36415 COLL VENOUS BLD VENIPUNCTURE: CPT

## 2021-07-26 PROCEDURE — 99214 OFFICE O/P EST MOD 30 MIN: CPT | Mod: 25

## 2021-07-26 PROCEDURE — 99072 ADDL SUPL MATRL&STAF TM PHE: CPT

## 2021-07-26 PROCEDURE — 76830 TRANSVAGINAL US NON-OB: CPT

## 2021-07-26 RX ORDER — CALCIUM CITRATE/VITAMIN D3 315MG-6.25
TABLET ORAL
Refills: 0 | Status: COMPLETED | COMMUNITY
End: 2021-07-26

## 2021-07-26 RX ORDER — ASCORBIC ACID 500 MG
TABLET ORAL
Refills: 0 | Status: COMPLETED | COMMUNITY
End: 2021-07-26

## 2021-07-26 RX ORDER — VITAMIN B COMPLEX
CAPSULE ORAL
Refills: 0 | Status: COMPLETED | COMMUNITY
Start: 2019-04-08 | End: 2021-07-26

## 2021-07-26 NOTE — HISTORY OF PRESENT ILLNESS
[N] : Patient does not use contraception [Yes] : pregnancy [Currently Active] : currently active [Men] : men [Patient reported PAP Smear was normal] : Patient reported PAP Smear was normal [Y] : Patient is sexually active [Patient reported mammogram was abnormal] : Patient reported mammogram was abnormal [Patient reported breast sonogram was abnormal] : Patient reported breast sonogram was abnormal [FreeTextEntry1] : Maurice is being seen emergently as a new patient for the evaluation of postmenopausal bleeding. She had her last normal period in 4/2019 and she had the usual s/s of menopause. She was diagnosed with Breast cancer in the left breast.(Subsequent review of her chart shows a small invasive ductal ca with both LCIS and DCIS in portions of the biopsies. She had a lumpectomy and RT but declined any chemoprophylaxis. She is uncertain of her receptor status (Review shows E+, P+, Her -) [place] node was negative. \par She recently entered a new relationship and she has been more sexually active and she noted the bleeding. She tried to walk in to her Gynecologist's office (Everette Blanton) but she was turned away. She was referrewd here by Dr. Bone.  [Mammogramdate] : 03/17/2021 [TextBox_19] : BR 4B [BreastSonogramDate] : 03/17/2021 [PapSmeardate] : 0701/2020 [ColonoscopyDate] : 07/01/2021 [PGHxTotal] : 2 [[facility]] : 2 [[facility]] : 2 [TextBox_9] : 14

## 2021-07-26 NOTE — DISCUSSION/SUMMARY
[FreeTextEntry1] : We discussed the concerns associated with abnormal uterine bleeding, postmenopausal bleeding and we reviewed the sources with a pelvic model.\par I reviewed a TV sonogram which was notable for a thin endometrial stripe (4.5 mm) and 2 clear right ovarian cysts.\par We discussed the possibility of a maya ovulation and we will check her hormone levels. All questions were answered and she will send me a portal message on Friday to go over her results.

## 2021-07-26 NOTE — PHYSICAL EXAM
[Appropriately responsive] : appropriately responsive [Alert] : alert [No Acute Distress] : no acute distress [No Lymphadenopathy] : no lymphadenopathy [Regular Rate Rhythm] : regular rate rhythm [No Murmurs] : no murmurs [Clear to Auscultation B/L] : clear to auscultation bilaterally [Soft] : soft [Non-tender] : non-tender [Non-distended] : non-distended [No HSM] : No HSM [No Lesions] : no lesions [No Mass] : no mass [Oriented x3] : oriented x3 [FreeTextEntry6] : Lumpectomy scar noted in Left breast UOQ--well healed [Examination Of The Breasts] : a normal appearance [No Masses] : no breast masses were palpable [Labia Majora] : normal [Labia Minora] : normal [Moderate] : There was moderate vaginal bleeding [Old Blood] : old blood was present in the vagina [Normal] : normal [Uterine Adnexae] : normal [No Tenderness] : no tenderness [Nl Sphincter Tone] : normal sphincter tone [FreeTextEntry9] : Heme negative

## 2021-07-26 NOTE — PROCEDURE
[Endometrial Biopsy] : Endometrial biopsy [Time out performed] : Pre-procedure time out performed.  Patient's name, date of birth and procedure confirmed. [Consent Obtained] : Consent obtained [Post-Menop. Bleeding] : post-menopausal bleeding [Risks] : risks [Benefits] : benefits [Alternatives] : alternatives [Patient] : patient [Infection] : infection [Bleeding] : bleeding [Uterine Perforation] : uterine perforation [Pain] : pain [No Premedication] : No premedication [None] : none [Easy Passage] : Easy passage [Anteverted] : anteverted [Moderate] : moderate [Sent to Pathology] : placed in buffered formalin and sent for pathology [Tolerated Well] : Patient tolerated the procedure well [No Complications] : No complications

## 2021-07-27 ENCOUNTER — APPOINTMENT (OUTPATIENT)
Dept: OBGYN | Facility: CLINIC | Age: 56
End: 2021-07-27

## 2021-07-28 DIAGNOSIS — N95.0 POSTMENOPAUSAL BLEEDING: ICD-10-CM

## 2021-08-01 ENCOUNTER — TRANSCRIPTION ENCOUNTER (OUTPATIENT)
Age: 56
End: 2021-08-01

## 2021-08-01 ENCOUNTER — LABORATORY RESULT (OUTPATIENT)
Age: 56
End: 2021-08-01

## 2021-08-01 PROBLEM — N95.0 POST-MENOPAUSAL BLEEDING: Status: ACTIVE | Noted: 2021-07-26

## 2021-08-01 LAB
BASOPHILS # BLD AUTO: 0.04 K/UL
BASOPHILS NFR BLD AUTO: 0.8 %
CORE LAB BIOPSY: NORMAL
EOSINOPHIL # BLD AUTO: 0.06 K/UL
EOSINOPHIL NFR BLD AUTO: 1.2 %
ESTRADIOL SERPL-MCNC: 42 PG/ML
FSH SERPL-MCNC: 46.5 IU/L
HCT VFR BLD CALC: 44.5 %
HGB BLD-MCNC: 14.3 G/DL
IMM GRANULOCYTES NFR BLD AUTO: 0.2 %
LH SERPL-ACNC: 30.5 IU/L
LYMPHOCYTES # BLD AUTO: 2.04 K/UL
LYMPHOCYTES NFR BLD AUTO: 39.6 %
MAN DIFF?: NORMAL
MCHC RBC-ENTMCNC: 31.2 PG
MCHC RBC-ENTMCNC: 32.1 GM/DL
MCV RBC AUTO: 96.9 FL
MONOCYTES # BLD AUTO: 0.46 K/UL
MONOCYTES NFR BLD AUTO: 8.9 %
NEUTROPHILS # BLD AUTO: 2.54 K/UL
NEUTROPHILS NFR BLD AUTO: 49.3 %
PLATELET # BLD AUTO: 334 K/UL
PROLACTIN SERPL-MCNC: 13 NG/ML
RBC # BLD: 4.59 M/UL
RBC # FLD: 13.2 %
TSH SERPL-ACNC: 5.48 UIU/ML
WBC # FLD AUTO: 5.15 K/UL

## 2021-08-07 ENCOUNTER — TRANSCRIPTION ENCOUNTER (OUTPATIENT)
Age: 56
End: 2021-08-07

## 2021-08-12 ENCOUNTER — APPOINTMENT (OUTPATIENT)
Dept: OBGYN | Facility: CLINIC | Age: 56
End: 2021-08-12

## 2021-08-16 ENCOUNTER — APPOINTMENT (OUTPATIENT)
Dept: OBGYN | Facility: CLINIC | Age: 56
End: 2021-08-16
Payer: COMMERCIAL

## 2021-08-16 ENCOUNTER — RESULT CHARGE (OUTPATIENT)
Age: 56
End: 2021-08-16

## 2021-08-16 DIAGNOSIS — R39.89 OTHER SYMPTOMS AND SIGNS INVOLVING THE GENITOURINARY SYSTEM: ICD-10-CM

## 2021-08-16 LAB
BILIRUB UR QL STRIP: NORMAL
GLUCOSE UR-MCNC: NORMAL
HCG UR QL: 0.2 EU/DL
HGB UR QL STRIP.AUTO: NORMAL
KETONES UR-MCNC: NORMAL
LEUKOCYTE ESTERASE UR QL STRIP: ABNORMAL
NITRITE UR QL STRIP: NORMAL
PH UR STRIP: 7
PROT UR STRIP-MCNC: NORMAL
SP GR UR STRIP: 1.01

## 2021-08-16 PROCEDURE — 99213 OFFICE O/P EST LOW 20 MIN: CPT

## 2021-08-16 PROCEDURE — 81003 URINALYSIS AUTO W/O SCOPE: CPT | Mod: QW

## 2021-08-16 NOTE — PLAN
[FreeTextEntry1] : Follow-up for annual exam patient states she already has her annual exam scheduled with her prior gynecologist\par Patient appreciative of our practice and for seeing her for her acute visits\par All questions answered\par Warning signs for immediate attention reviewed

## 2021-08-16 NOTE — HISTORY OF PRESENT ILLNESS
[N] : Patient does not use contraception [Y] : Positive pregnancy history [Menarche Age: ____] : age at menarche was [unfilled] [Currently Active] : currently active [Men] : men [Vaginal] : vaginal [No] : No [Patient refuses STI testing] : Patient refuses STI testing [TextBox_4] : 56-year-old patient presents with a possible urinary tract infection with complaints of urinary urgency on Saturday\par Patient states she is prone to recurrent UTI 2-3 urinary tract infections for a year\par \par Discussed good bladder habits and patient states she is sexually active twice a day and drinks a lot of water-benefits of vaginal probiotics was reviewed\par \par Patient denies any nausea vomiting fever chills or fatigue lethargy symptoms\par \par Patient is a breast cancer survivor and was diagnosed with invasive ductal carcinoma breast and underwent lumpectomy and radiation but declined chemotherapy and is unsure of her receptor status\par \par Patient's last normal period was April 2019\par \par Patient is a patient of Dr. Blanton her gynecologist-she has an appointment with him for her annual exam next week she states\par \par She also presents today per Dr. Martinez to have her thyroid levels checked-orders in system-\par \par Denies any vaginal bleeding [PapSmeardate] : 07/01/2020 [TextBox_31] : as per pt  [LMPDate] : 07/01/2021 [PGHxTotal] : 2 [[facility]] : 2 [[facility]Living] : 2 [FreeTextEntry1] : 07/01/2021

## 2021-08-16 NOTE — COUNSELING
[Bladder Hygiene] : bladder hygiene [Nutrition/ Exercise/ Weight Management] : nutrition, exercise, weight management [Vitamins/Supplements] : vitamins/supplements

## 2021-08-17 ENCOUNTER — NON-APPOINTMENT (OUTPATIENT)
Age: 56
End: 2021-08-17

## 2021-08-19 ENCOUNTER — APPOINTMENT (OUTPATIENT)
Dept: OBGYN | Facility: CLINIC | Age: 56
End: 2021-08-19
Payer: COMMERCIAL

## 2021-08-19 VITALS
WEIGHT: 122 LBS | BODY MASS INDEX: 19.15 KG/M2 | DIASTOLIC BLOOD PRESSURE: 60 MMHG | HEIGHT: 67 IN | SYSTOLIC BLOOD PRESSURE: 110 MMHG

## 2021-08-19 DIAGNOSIS — N93.8 OTHER SPECIFIED ABNORMAL UTERINE AND VAGINAL BLEEDING: ICD-10-CM

## 2021-08-19 DIAGNOSIS — N39.0 URINARY TRACT INFECTION, SITE NOT SPECIFIED: ICD-10-CM

## 2021-08-19 PROBLEM — Z00.00 ENCOUNTER FOR PREVENTIVE HEALTH EXAMINATION: Status: ACTIVE | Noted: 2021-08-19

## 2021-08-19 PROCEDURE — 82274 ASSAY TEST FOR BLOOD FECAL: CPT | Mod: QW

## 2021-08-19 PROCEDURE — 99396 PREV VISIT EST AGE 40-64: CPT

## 2021-08-19 PROCEDURE — 81002 URINALYSIS NONAUTO W/O SCOPE: CPT

## 2021-08-23 LAB
APPEARANCE: CLEAR
BACTERIA UR CULT: NORMAL
BACTERIA: NEGATIVE
BILIRUBIN URINE: NEGATIVE
BLOOD URINE: NEGATIVE
CALCIUM OXALATE CRYSTALS: ABNORMAL
COLOR: YELLOW
GLUCOSE QUALITATIVE U: NEGATIVE
HPV HIGH+LOW RISK DNA PNL CVX: NOT DETECTED
HYALINE CASTS: 0 /LPF
KETONES URINE: NEGATIVE
LEUKOCYTE ESTERASE URINE: NEGATIVE
MICROSCOPIC-UA: NORMAL
NITRITE URINE: NEGATIVE
PH URINE: 6.5
PROTEIN URINE: NORMAL
RED BLOOD CELLS URINE: 1 /HPF
SPECIFIC GRAVITY URINE: 1.01
SQUAMOUS EPITHELIAL CELLS: 0 /HPF
UROBILINOGEN URINE: NORMAL
WHITE BLOOD CELLS URINE: 0 /HPF

## 2021-08-25 LAB — CYTOLOGY CVX/VAG DOC THIN PREP: NORMAL

## 2021-08-27 LAB — URINE CYTOLOGY: NORMAL

## 2021-10-05 ENCOUNTER — APPOINTMENT (OUTPATIENT)
Dept: CT IMAGING | Facility: CLINIC | Age: 56
End: 2021-10-05
Payer: COMMERCIAL

## 2021-10-05 ENCOUNTER — RESULT REVIEW (OUTPATIENT)
Age: 56
End: 2021-10-05

## 2021-10-05 ENCOUNTER — OUTPATIENT (OUTPATIENT)
Dept: OUTPATIENT SERVICES | Facility: HOSPITAL | Age: 56
LOS: 1 days | End: 2021-10-05
Payer: COMMERCIAL

## 2021-10-05 DIAGNOSIS — Z98.890 OTHER SPECIFIED POSTPROCEDURAL STATES: Chronic | ICD-10-CM

## 2021-10-05 DIAGNOSIS — Z00.8 ENCOUNTER FOR OTHER GENERAL EXAMINATION: ICD-10-CM

## 2021-10-05 DIAGNOSIS — N31.0 UNINHIBITED NEUROPATHIC BLADDER, NOT ELSEWHERE CLASSIFIED: ICD-10-CM

## 2021-10-05 PROCEDURE — 74178 CT ABD&PLV WO CNTR FLWD CNTR: CPT | Mod: 26

## 2021-10-05 PROCEDURE — 74178 CT ABD&PLV WO CNTR FLWD CNTR: CPT

## 2021-10-07 ENCOUNTER — APPOINTMENT (OUTPATIENT)
Age: 56
End: 2021-10-07

## 2021-11-15 ENCOUNTER — APPOINTMENT (OUTPATIENT)
Dept: OBGYN | Facility: CLINIC | Age: 56
End: 2021-11-15

## 2021-11-15 ENCOUNTER — NON-APPOINTMENT (OUTPATIENT)
Age: 56
End: 2021-11-15

## 2021-11-15 ENCOUNTER — APPOINTMENT (OUTPATIENT)
Dept: OBGYN | Facility: CLINIC | Age: 56
End: 2021-11-15
Payer: COMMERCIAL

## 2021-11-15 VITALS
DIASTOLIC BLOOD PRESSURE: 70 MMHG | BODY MASS INDEX: 19.46 KG/M2 | SYSTOLIC BLOOD PRESSURE: 100 MMHG | WEIGHT: 124 LBS | HEIGHT: 67 IN

## 2021-11-15 DIAGNOSIS — R63.1 POLYDIPSIA: ICD-10-CM

## 2021-11-15 DIAGNOSIS — Z01.419 ENCOUNTER FOR GYNECOLOGICAL EXAMINATION (GENERAL) (ROUTINE) W/OUT ABNORMAL FINDINGS: ICD-10-CM

## 2021-11-15 DIAGNOSIS — Z78.0 ASYMPTOMATIC MENOPAUSAL STATE: ICD-10-CM

## 2021-11-15 LAB
T3 SERPL-MCNC: 96 NG/DL
T3FREE SERPL-MCNC: 2.88 PG/ML
T4 FREE SERPL-MCNC: 1 NG/DL
T4 SERPL-MCNC: 6.3 UG/DL
THYROGLOB AB SERPL-ACNC: <20 IU/ML
THYROPEROXIDASE AB SERPL IA-ACNC: 14.4 IU/ML
TSH SERPL-ACNC: 3.07 UIU/ML

## 2021-11-15 PROCEDURE — 36415 COLL VENOUS BLD VENIPUNCTURE: CPT

## 2021-11-15 PROCEDURE — 99396 PREV VISIT EST AGE 40-64: CPT

## 2021-11-15 PROCEDURE — 82270 OCCULT BLOOD FECES: CPT

## 2021-11-15 NOTE — HISTORY OF PRESENT ILLNESS
[N] : Patient does not use contraception [Menarche Age: ____] : age at menarche was [unfilled] [Currently Active] : currently active [Men] : men [Vaginal] : vaginal [No] : No [Y] : Positive pregnancy history [Mammogramdate] : 03/2021 [TextBox_19] : NORMAL AS PER PT  [PapSmeardate] : 08/19/21 [TextBox_31] : NEG [ColonoscopyDate] : 2021 [HPVDate] : 08/19/21 [TextBox_78] : NEG [LMPDate] : 07/2021 [PGHxTotal] : 2 [[facility]] : 2 [FreeTextEntry1] : 07/2021

## 2021-11-15 NOTE — PHYSICAL EXAM
[Appropriately responsive] : appropriately responsive [Alert] : alert [No Acute Distress] : no acute distress [No Lymphadenopathy] : no lymphadenopathy [Regular Rate Rhythm] : regular rate rhythm [No Murmurs] : no murmurs [Clear to Auscultation B/L] : clear to auscultation bilaterally [Soft] : soft [Non-tender] : non-tender [Non-distended] : non-distended [No HSM] : No HSM [No Lesions] : no lesions [No Mass] : no mass [Oriented x3] : oriented x3 [Examination Of The Breasts] : a normal appearance [No Discharge] : no discharge [No Masses] : no breast masses were palpable [Labia Majora] : normal [Labia Minora] : normal [No Bleeding] : There was no active vaginal bleeding [Normal] : normal [Uterine Adnexae] : normal [FreeTextEntry9] : Stool for occult blood.

## 2021-11-15 NOTE — DISCUSSION/SUMMARY
[FreeTextEntry1] : During this visit comprehensive counseling was given regarding the following concerns:\par 1 We discussed the need for proper nutrition and exercise. This includes cardiovascular and\par pelvic floor exercise.\par 2 We discussed the importance of maintaining a proper vaccination schedule and we discussed\par the utility of the flu vaccine and TDaP.\par 3 We discussed the impact of chronic sun exposure and the risk for skin disease as a result. The\par benefits of a total body scan by a Dermatologist was reviewed..\par 4 We discussed the need for certain supplements for most people which include vitamin D3,\par calcium rich foods with limitation on calcium supplementation by tabular form to 600 mg by\par mouth daily. As part of a bone health program we recommend weight bearing exercises, and\par some limited sun exposure (10-15 minutes daily) to help convert vitamin D to its active form.\par \par The definitions of perimenopause and menopause were discussed with the patient. The etiology\par of vasomotor symptoms was explained. \par \par Blood work reviewed with patient today.\par \par Recommended to have a cholesterol profile with her PCP.\par \par Prescription for mammogram screening and breast sonogram given.\par \par Prescription for DEXA studies were given.\par \par Vitamin D and A1C levels checked today through blood work.\par \par Pelvic sonogram ordered today. Follow up in 02/2022 for pelvic sonogram or as needed.\par \par During this visit 20 minutes were spent face-to-face with greater than 50% of the time dedicated\par to counseling.

## 2021-11-15 NOTE — END OF VISIT
[FreeTextEntry3] : I, [Yuri Mason] solely acted as scribe for Dr. Everette Martinez on 11/15/2021.\par All medical entries made by the Scribe were at my, Dr. Martinez’s, direction and personally\par dictated by me on 11/15/2021.  I have reviewed the chart and agree that the record\par accurately reflects my personal performance of the history, physical exam, assessment and plan. I\par have also personally directed, reviewed, and agreed with the chart.

## 2021-11-16 ENCOUNTER — APPOINTMENT (OUTPATIENT)
Dept: ORTHOPEDIC SURGERY | Facility: CLINIC | Age: 56
End: 2021-11-16

## 2021-11-22 ENCOUNTER — OUTPATIENT (OUTPATIENT)
Dept: OUTPATIENT SERVICES | Facility: HOSPITAL | Age: 56
LOS: 1 days | End: 2021-11-22
Payer: COMMERCIAL

## 2021-11-22 ENCOUNTER — APPOINTMENT (OUTPATIENT)
Dept: RADIOLOGY | Facility: CLINIC | Age: 56
End: 2021-11-22
Payer: COMMERCIAL

## 2021-11-22 DIAGNOSIS — Z98.890 OTHER SPECIFIED POSTPROCEDURAL STATES: Chronic | ICD-10-CM

## 2021-11-22 DIAGNOSIS — Z78.0 ASYMPTOMATIC MENOPAUSAL STATE: ICD-10-CM

## 2021-11-22 PROCEDURE — 77085 DXA BONE DENSITY AXL VRT FX: CPT

## 2021-11-22 PROCEDURE — 77085 DXA BONE DENSITY AXL VRT FX: CPT | Mod: 26

## 2021-12-09 ENCOUNTER — TRANSCRIPTION ENCOUNTER (OUTPATIENT)
Age: 56
End: 2021-12-09

## 2021-12-09 LAB
25(OH)D3 SERPL-MCNC: 46.1 NG/ML
CYTOLOGY CVX/VAG DOC THIN PREP: NORMAL
DATE COLLECTED: NORMAL
ESTIMATED AVERAGE GLUCOSE: 108 MG/DL
HBA1C MFR BLD HPLC: 5.4 %
HEMOCCULT SP1 STL QL: NEGATIVE
HPV HIGH+LOW RISK DNA PNL CVX: NOT DETECTED
QUALITY CONTROL: YES

## 2022-02-28 ENCOUNTER — APPOINTMENT (OUTPATIENT)
Dept: ANTEPARTUM | Facility: CLINIC | Age: 57
End: 2022-02-28

## 2022-03-29 ENCOUNTER — APPOINTMENT (OUTPATIENT)
Dept: ULTRASOUND IMAGING | Facility: CLINIC | Age: 57
End: 2022-03-29
Payer: COMMERCIAL

## 2022-03-29 ENCOUNTER — APPOINTMENT (OUTPATIENT)
Dept: MAMMOGRAPHY | Facility: CLINIC | Age: 57
End: 2022-03-29
Payer: COMMERCIAL

## 2022-03-29 ENCOUNTER — OUTPATIENT (OUTPATIENT)
Dept: OUTPATIENT SERVICES | Facility: HOSPITAL | Age: 57
LOS: 1 days | End: 2022-03-29
Payer: COMMERCIAL

## 2022-03-29 DIAGNOSIS — C50.812 MALIGNANT NEOPLASM OF OVERLAPPING SITES OF LEFT FEMALE BREAST: ICD-10-CM

## 2022-03-29 DIAGNOSIS — Z98.890 OTHER SPECIFIED POSTPROCEDURAL STATES: Chronic | ICD-10-CM

## 2022-03-29 PROCEDURE — G0279: CPT | Mod: 26

## 2022-03-29 PROCEDURE — 77066 DX MAMMO INCL CAD BI: CPT | Mod: 26

## 2022-03-29 PROCEDURE — 77066 DX MAMMO INCL CAD BI: CPT

## 2022-03-29 PROCEDURE — 76641 ULTRASOUND BREAST COMPLETE: CPT

## 2022-03-29 PROCEDURE — 76641 ULTRASOUND BREAST COMPLETE: CPT | Mod: 26,50

## 2022-03-29 PROCEDURE — G0279: CPT

## 2022-03-30 ENCOUNTER — NON-APPOINTMENT (OUTPATIENT)
Age: 57
End: 2022-03-30

## 2022-03-30 ENCOUNTER — APPOINTMENT (OUTPATIENT)
Dept: OBGYN | Facility: CLINIC | Age: 57
End: 2022-03-30
Payer: COMMERCIAL

## 2022-03-30 VITALS
BODY MASS INDEX: 19.49 KG/M2 | HEIGHT: 67 IN | SYSTOLIC BLOOD PRESSURE: 116 MMHG | DIASTOLIC BLOOD PRESSURE: 66 MMHG | WEIGHT: 124.2 LBS

## 2022-03-30 DIAGNOSIS — Z85.3 PERSONAL HISTORY OF MALIGNANT NEOPLASM OF BREAST: ICD-10-CM

## 2022-03-30 LAB
BILIRUB UR QL STRIP: NORMAL
COLLECTION METHOD: NORMAL
GLUCOSE UR-MCNC: NORMAL
HCG UR QL: 0.2 EU/DL
HGB UR QL STRIP.AUTO: NORMAL
KETONES UR-MCNC: NORMAL
LEUKOCYTE ESTERASE UR QL STRIP: ABNORMAL
NITRITE UR QL STRIP: NORMAL
PH UR STRIP: 7.5
PROT UR STRIP-MCNC: NORMAL
SP GR UR STRIP: 1.01

## 2022-03-30 PROCEDURE — 81003 URINALYSIS AUTO W/O SCOPE: CPT | Mod: QW

## 2022-03-30 PROCEDURE — 99214 OFFICE O/P EST MOD 30 MIN: CPT

## 2022-03-31 ENCOUNTER — APPOINTMENT (OUTPATIENT)
Dept: OBGYN | Facility: CLINIC | Age: 57
End: 2022-03-31
Payer: COMMERCIAL

## 2022-03-31 ENCOUNTER — APPOINTMENT (OUTPATIENT)
Dept: ANTEPARTUM | Facility: CLINIC | Age: 57
End: 2022-03-31
Payer: COMMERCIAL

## 2022-03-31 ENCOUNTER — ASOB RESULT (OUTPATIENT)
Age: 57
End: 2022-03-31

## 2022-03-31 VITALS
SYSTOLIC BLOOD PRESSURE: 104 MMHG | DIASTOLIC BLOOD PRESSURE: 70 MMHG | BODY MASS INDEX: 19.46 KG/M2 | WEIGHT: 124 LBS | HEIGHT: 67 IN

## 2022-03-31 PROCEDURE — 99214 OFFICE O/P EST MOD 30 MIN: CPT

## 2022-03-31 PROCEDURE — 76830 TRANSVAGINAL US NON-OB: CPT

## 2022-04-03 ENCOUNTER — TRANSCRIPTION ENCOUNTER (OUTPATIENT)
Age: 57
End: 2022-04-03

## 2022-04-03 ENCOUNTER — NON-APPOINTMENT (OUTPATIENT)
Age: 57
End: 2022-04-03

## 2022-04-03 PROBLEM — Z85.3 HISTORY OF BREAST CANCER: Status: ACTIVE | Noted: 2022-04-03

## 2022-04-03 LAB — BACTERIA UR CULT: ABNORMAL

## 2022-04-03 NOTE — DISCUSSION/SUMMARY
[FreeTextEntry1] : Pelvic sonogram was reviewed and revealed the following: homogeneous uterus measuring 4.93 cm. Endometrial thickness measuring 2.4mm. [place]ab[place] cysts seen. Simple right ovarian cyst with 3 mm calcification measuring 3.38 cm. Normal appearing left ovary. \par \par Will repeat pelvic sonogram. \par \par We discussed the availability and utility of genetic screening. Literature on Color Genetic Screening was provided. We discussed the nature of this information and the possible need for further evaluation or even prophylactic treatment based on the results.\par \par All questions and concerns were addressed. \par \par She will follow up in 2 months for a pelvic sono or as needed.

## 2022-04-03 NOTE — DISCUSSION/SUMMARY
[FreeTextEntry1] : Benign pelvic exam. Pale lining and dry mucosa noted on exam. Urine culture sent out today. Prescription for Macrobid given. Instructions discussed. Advised to send me a portal note on 04/01/2022 in regards to her urine culture results. Advised she can take Azo for two more days. \par \par The definitions of perimenopause and menopause were discussed with the patient. The etiology\par of vasomotor symptoms was explained. Management options of symptoms, including hormonal\par and non-hormonal therapies, were offered. All of her concerns were addressed, questions\par answered and reassurance was given. Patient desires topical vaginal estrogen support. Prescription for Estradiol cream was given. Instructions discussed.\par \par I recommended to drink lots of water daily and to empty her bladder fully. \par \par All questions and concerns were discussed. \par \par During this visit 30 minutes were spent face-to-face with greater than 50% of the time dedicated\par to counseling.

## 2022-04-03 NOTE — END OF VISIT
[FreeTextEntry3] : I, Drissjune Welch solely acted as scribe for Dr. Everetet Martinez on 03/30/2022.\par All medical entries made by the Scribe were at my, Dr. Martinez’s, direction and personally\par dictated by me on 03/30/2022.  I have reviewed the chart and agree that the record\par accurately reflects my personal performance of the history, physical exam, assessment and plan. I\par have also personally directed, reviewed, and agreed with the chart.

## 2022-04-03 NOTE — PHYSICAL EXAM
[Chaperone Present] : A chaperone was present in the examining room during all aspects of the physical examination [Appropriately responsive] : appropriately responsive [Alert] : alert [No Acute Distress] : no acute distress [Oriented x3] : oriented x3 [Labia Majora] : normal [Labia Minora] : normal [Atrophy] : atrophy [Dry Mucosa] : dry mucosa [No Bleeding] : There was no active vaginal bleeding [Normal] : normal [Uterine Adnexae] : normal [FreeTextEntry1] : MA: Thanh

## 2022-04-03 NOTE — HISTORY OF PRESENT ILLNESS
[postmenopausal] : postmenopausal [N] : Patient does not use contraception [Y] : Positive pregnancy history [Menarche Age: ____] : age at menarche was [unfilled] [No] : Patient does not have concerns regarding sex [Previously active] : previously active [TextBox_4] : Follow Up [Mammogramdate] : 03/29/22 [TextBox_19] : br2 [BreastSonogramDate] : 03/29/22 [TextBox_25] : br2 [PapSmeardate] : 11/15/21 [TextBox_31] : neg [BoneDensityDate] : 11/22/21 [HPVDate] : 08/19/21 [TextBox_37] : osteopnia [TextBox_78] : neg [LMPDate] : 11/2021 [[facility]] : 2 [PGHxTotal] : 2 [[facility]] : 2 [FreeTextEntry1] : 11/2021

## 2022-04-03 NOTE — PHYSICAL EXAM
[Chaperone Present] : A chaperone was present in the examining room during all aspects of the physical examination [Appropriately responsive] : appropriately responsive [Alert] : alert [No Acute Distress] : no acute distress [Oriented x3] : oriented x3 [FreeTextEntry1] : Kami

## 2022-04-03 NOTE — END OF VISIT
[FreeTextEntry3] : I, Daksha Hunter, solely acted as a scribe for Dr. Everette Martinez on 03/31/2022 . All medical entries made by the Scribe were at my, Dr. Martinez's, direction and personally dictated by me on 03/31/2022. I have reviewed the chart and agree that the record accurately reflects my personal performance of the history, physical exam, assessment and plan. I have also personally directed, reviewed and agreed with the chart.

## 2022-04-03 NOTE — HISTORY OF PRESENT ILLNESS
[HPV test offered] : HPV test offered [Menarche Age: ____] : age at menarche was [unfilled] [No] : Patient does not have concerns regarding sex [N] : Patient does not use contraception [Y] : Patient is sexually active [Currently Active] : currently active [TextBox_4] : Pt presents for evaluation of UTI symptoms [Mammogramdate] : 03/29/22 [TextBox_19] : BR2 [BreastSonogramDate] : 03/29/22 [TextBox_25] : BR2 [PapSmeardate] : 11/15/21 [TextBox_31] : neg [ColonoscopyDate] : 11/22/21 [TextBox_43] : osteopenia [HPVDate] : 11/15/21 [TextBox_78] : neg [LMPDate] : 11/2021 [PGHxTotal] : 2 [[facility]] : 2 [FreeTextEntry1] : 11/2021

## 2022-05-18 ENCOUNTER — APPOINTMENT (OUTPATIENT)
Dept: ANTEPARTUM | Facility: CLINIC | Age: 57
End: 2022-05-18
Payer: COMMERCIAL

## 2022-05-18 ENCOUNTER — ASOB RESULT (OUTPATIENT)
Age: 57
End: 2022-05-18

## 2022-05-18 ENCOUNTER — APPOINTMENT (OUTPATIENT)
Dept: OBGYN | Facility: CLINIC | Age: 57
End: 2022-05-18
Payer: COMMERCIAL

## 2022-05-18 VITALS
SYSTOLIC BLOOD PRESSURE: 108 MMHG | WEIGHT: 123 LBS | HEIGHT: 66 IN | DIASTOLIC BLOOD PRESSURE: 64 MMHG | BODY MASS INDEX: 19.77 KG/M2

## 2022-05-18 PROCEDURE — 76830 TRANSVAGINAL US NON-OB: CPT

## 2022-05-18 PROCEDURE — 76856 US EXAM PELVIC COMPLETE: CPT | Mod: 59

## 2022-05-18 PROCEDURE — 99214 OFFICE O/P EST MOD 30 MIN: CPT

## 2022-05-18 RX ORDER — HALOBETASOL PROPIONATE 0.5 MG/G
0.05 CREAM TOPICAL
Refills: 0 | Status: DISCONTINUED | COMMUNITY
End: 2022-05-18

## 2022-05-18 RX ORDER — SODIUM SULFATE, POTASSIUM SULFATE, MAGNESIUM SULFATE 17.5; 3.13; 1.6 G/ML; G/ML; G/ML
17.5-3.13-1.6 SOLUTION, CONCENTRATE ORAL
Qty: 354 | Refills: 0 | Status: DISCONTINUED | COMMUNITY
Start: 2021-06-15 | End: 2022-05-18

## 2022-05-18 RX ORDER — TRIAMCINOLONE ACETONIDE 0.25 MG/G
0.03 CREAM TOPICAL
Qty: 15 | Refills: 0 | Status: DISCONTINUED | COMMUNITY
Start: 2021-07-23 | End: 2022-05-18

## 2022-05-18 RX ORDER — ESTRADIOL 0.1 MG/G
0.1 CREAM VAGINAL
Qty: 1 | Refills: 2 | Status: DISCONTINUED | COMMUNITY
Start: 2022-03-30 | End: 2022-05-18

## 2022-05-18 RX ORDER — VITAMIN B COMPLEX
CAPSULE ORAL
Refills: 0 | Status: DISCONTINUED | COMMUNITY
End: 2022-05-18

## 2022-05-18 RX ORDER — TRIAMCINOLONE ACETONIDE 1 MG/G
0.1 CREAM TOPICAL
Qty: 15 | Refills: 0 | Status: DISCONTINUED | COMMUNITY
Start: 2021-07-23 | End: 2022-05-18

## 2022-05-18 RX ORDER — NITROFURANTOIN MACROCRYSTALS 100 MG/1
100 CAPSULE ORAL
Qty: 14 | Refills: 0 | Status: DISCONTINUED | COMMUNITY
Start: 2021-08-16 | End: 2022-05-18

## 2022-05-18 RX ORDER — NITROFURANTOIN (MONOHYDRATE/MACROCRYSTALS) 25; 75 MG/1; MG/1
100 CAPSULE ORAL
Qty: 20 | Refills: 0 | Status: DISCONTINUED | COMMUNITY
Start: 2022-03-30 | End: 2022-05-18

## 2022-05-18 NOTE — HISTORY OF PRESENT ILLNESS
[N] : Patient does not use contraception [Y] : Positive pregnancy history [Menarche Age: ____] : age at menarche was [unfilled] [Currently Active] : currently active [Mammogramdate] : 3/29/22 [TextBox_19] : br2 [BreastSonogramDate] : 3/29/22 [TextBox_25] : br2 [PapSmeardate] : 11/15/21 [TextBox_31] : neg [BoneDensityDate] : 11/22/21 [TextBox_37] : osteopenia [ColonoscopyDate] : 2021 [TextBox_43] : as per pt  [HPVDate] : 11/15/21 [TextBox_78] : neg [LMPDate] : 11/2021 [PGHxTotal] : 2 [[facility]] : 2 [[facility]] : 2 [FreeTextEntry1] : 11/20241

## 2022-05-18 NOTE — PHYSICAL EXAM
[Chaperone Present] : A chaperone was present in the examining room during all aspects of the physical examination [FreeTextEntry1] : DIMAS Zuluaga [Appropriately responsive] : appropriately responsive [Alert] : alert [No Acute Distress] : no acute distress

## 2022-05-18 NOTE — DISCUSSION/SUMMARY
[FreeTextEntry1] : We discussed the favorable sono results, the endometrial stripe is 3 mm, the right ovarian cyst is smaller. We discussed the plan to repeat her exam in late 2022 or early 2023 as [place]. All questions were answered. \par All questions were answered.

## 2022-06-03 NOTE — H&P PST ADULT - NS PRO FEM REPRO HEALTH SCREEN
Quality 402: Tobacco Use And Help With Quitting Among Adolescents: Patient screened for tobacco and never smoked Detail Level: Detailed Quality 431: Preventive Care And Screening: Unhealthy Alcohol Use - Screening: Patient not identified as an unhealthy alcohol user when screened for unhealthy alcohol use using a systematic screening method Quality 110: Preventive Care And Screening: Influenza Immunization: Influenza immunization was not ordered or administered, reason not given mammogram

## 2022-09-07 ENCOUNTER — APPOINTMENT (OUTPATIENT)
Dept: PLASTIC SURGERY | Facility: CLINIC | Age: 57
End: 2022-09-07

## 2022-09-07 VITALS
OXYGEN SATURATION: 98 % | HEART RATE: 72 BPM | WEIGHT: 123 LBS | TEMPERATURE: 208.04 F | BODY MASS INDEX: 19.77 KG/M2 | DIASTOLIC BLOOD PRESSURE: 73 MMHG | HEIGHT: 66 IN | SYSTOLIC BLOOD PRESSURE: 110 MMHG

## 2022-09-07 PROCEDURE — 99213 OFFICE O/P EST LOW 20 MIN: CPT

## 2022-09-07 NOTE — ASSESSMENT
[FreeTextEntry1] : History of left breast cancer.\par Clinical breast exam negative\par \par 1. Annual bilateral mammogram and breast ultrasound due 3/2023\par 2. Follow up office visit due 3/2023 after obtaining her annual studies. \par 3. Advised monthly self breast examinations and advised her to contact me if she has any concerns.\par \par This patient encounter took 25 minutes today to perform records review, chart preparation, clinical encounter, coordination of care and documentation. \par

## 2022-09-07 NOTE — CONSULT LETTER
[Dear  ___] : Dear  [unfilled], [Consult Letter:] : I had the pleasure of evaluating your patient, [unfilled]. [Please see my note below.] : Please see my note below. [Consult Closing:] : Thank you very much for allowing me to participate in the care of this patient.  If you have any questions, please do not hesitate to contact me. [Sincerely,] : Sincerely, [DrRuthann  ___] : Dr. SPICER [FreeTextEntry3] : Susan M. Palleschi, MD, FACS\par Division of Breast Surgery\par Director, Breast Surgery\par [facility]\par [address] [place] [address]\par [address]\[place] [address]\par (Phone) [phone]\par (Fax) [phone]

## 2022-09-07 NOTE — HISTORY OF PRESENT ILLNESS
[FreeTextEntry1] : Patient is a 57 year old female here for breast cancer recurrence surveillance. \par Last seen by me in office on 4/6/2022. \par She has a history of left breast cancer (Stage IA, 3/2019), s/p left lumpectomy, XRT (Dr. Guzman)\par No family history of breast cancer. \par 2/1/2019 Invitae genetic testing negative. \par 3/29/2019 Oncotype Dx recurrence score 11. \par Med ONC: Dr. Roper; she deferred endocrine therapy\par OBGYN: Dr. Everette Martinez, who follows her ovarian cyst, vaginal irritation on testing, and frequent UTIs. \par 3/17/2021 Bilateral mammogram/ultrasound: right 12:00 focal calcifications increased with a linear appearance. Bi-rads 4B, advise stereotactic biopsy.\par 3/26/2021 Right stereotactic biopsy: Cork shaped clip placed. Pathology benign. Advise resuming annual mammography. \par 3/29/2022 Bilateral mammogram: multiple scattered calcifications are again identified bilaterally, more numerous within the right breast than within the left, several of which are in more focal aggregates. This includes a focal aggregate of intermixed coarse and fine punctate calcifications within the posterior right upper outer quadrant, which appear essentially mammographically stable. Scattered finer calcifications are also noted within the right upper outer quadrant which also appear essentially unchanged. \par A clip is noted within the posterior approximate 12:00 axis of the right breast, from most recent stereotactic biopsy. Scattered adjacent fine punctate calcifications appear unchanged. 2 additional clips are noted within the right breast. A clip is also noted in place within the left breast. \par Bilateral breast ultrasound: Right breast- The previously noted hypoechoic nodule at the retroareolar 9:00 axis, in a superficial location, appears unchanged, measuring 0.5 x 0.4 x 0.7 cm. Mild retroareolar ductal ectasia is again noted. \par Left breast- post lumpectomy parenchymal change is again identified scar site at the 10:00 axis, 3 cm from the nipple. Mild retroareolar ductal ectasia is again noted. No suspicious solid mass. \par Recommend mammography in 1 year. Bi-rads 2. \par She denies any concerns on self breast examination.

## 2022-09-07 NOTE — PHYSICAL EXAM
[Normocephalic] : normocephalic [Atraumatic] : atraumatic [Supple] : supple [No Supraclavicular Adenopathy] : no supraclavicular adenopathy [Examined in the supine and seated position] : examined in the supine and seated position [No dominant masses] : no dominant masses in right breast  [No dominant masses] : no dominant masses left breast [No Nipple Retraction] : no left nipple retraction [No Nipple Discharge] : no left nipple discharge [No Axillary Lymphadenopathy] : no left axillary lymphadenopathy [No Edema] : no edema [No Rashes] : no rashes [No Ulceration] : no ulceration [EOMI] : extra ocular movement intact [Sclera nonicteric] : sclera nonicteric [No Cervical Adenopathy] : no cervical adenopathy [No Thyromegaly] : no thyromegaly [de-identified] : Well healed right outer periareolar incision.  [de-identified] : Left upper outer periareolar lumpectomy scar and left axillary incision.

## 2022-11-16 ENCOUNTER — APPOINTMENT (OUTPATIENT)
Dept: OBGYN | Facility: CLINIC | Age: 57
End: 2022-11-16

## 2023-02-14 NOTE — ED PROVIDER NOTE - CPE EDP EYES NORM

## 2023-04-05 ENCOUNTER — APPOINTMENT (OUTPATIENT)
Dept: OBGYN | Facility: CLINIC | Age: 58
End: 2023-04-05
Payer: COMMERCIAL

## 2023-04-05 VITALS
DIASTOLIC BLOOD PRESSURE: 85 MMHG | SYSTOLIC BLOOD PRESSURE: 121 MMHG | WEIGHT: 123 LBS | BODY MASS INDEX: 19.77 KG/M2 | HEIGHT: 66 IN

## 2023-04-05 DIAGNOSIS — Z78.9 OTHER SPECIFIED HEALTH STATUS: ICD-10-CM

## 2023-04-05 DIAGNOSIS — Z01.411 ENCOUNTER FOR GYNECOLOGICAL EXAMINATION (GENERAL) (ROUTINE) WITH ABNORMAL FINDINGS: ICD-10-CM

## 2023-04-05 DIAGNOSIS — Z87.891 PERSONAL HISTORY OF NICOTINE DEPENDENCE: ICD-10-CM

## 2023-04-05 PROCEDURE — 99396 PREV VISIT EST AGE 40-64: CPT

## 2023-04-05 NOTE — HISTORY OF PRESENT ILLNESS
[FreeTextEntry1] : JOSE MIGUEL  presents for an annual gynecology exam, she is doing well.  [Mammogramdate] : 1/22/19 [BreastSonogramDate] : 1/22/19 [PapSmeardate] : 11/15/21 [BoneDensityDate] : 11/22/21 [ColonoscopyDate] : 2021 [HPVDate] : 11/15/21 [LMPDate] : 2019

## 2023-04-05 NOTE — DISCUSSION/SUMMARY
[FreeTextEntry1] : Pap smear collected.\par \par -Recommended calcium rich foods, vitamin D supplements and light weight bearing exercises. \par \par -CBC and Iron levels drawn today as she is a total vegetarian\par \par -Prescription for mammogram,breast ultrasound and DEXA scan given today.\par \par -During this visit comprehensive counseling was given regarding the following concerns:\par \par 1. We discussed the need for proper nutrition and exercise. This includes cardiovascular and pelvic floor exercises.\par \par 2. We discussed the importance of maintaining proper vaccination schedule and we discussed the utility of the HPV, Influenza, Shingles, and TDaP vaccines. \par \par 3. We discussed the impact of chronic sun exposure and the risk for skin disease as a result. The benefits of a total body scan by a Dermatologist was reviewed.\par \par 4. We discussed the need for certain supplements for most people, which include Vitamin D3 and calcium rich foods with limitation on calcium supplementation. As part of bone health program, we recommend weight bearing exercises and limited sun exposure (10-15 minutes daily) to help convert Vitamin D to its active form.\par \par All questions and concerns were addressed at today's visit.

## 2023-04-05 NOTE — PHYSICAL EXAM
[Chaperone Present] : A chaperone was present in the examining room during all aspects of the physical examination [Appropriately responsive] : appropriately responsive [Alert] : alert [No Acute Distress] : no acute distress [No Lymphadenopathy] : no lymphadenopathy [Soft] : soft [Non-tender] : non-tender [No Lesions] : no lesions [No Mass] : no mass [Oriented x3] : oriented x3 [Examination Of The Breasts] : a normal appearance [No Discharge] : no discharge [No Masses] : no breast masses were palpable [Labia Majora] : normal [Labia Minora] : normal [Normal] : normal [Uterine Adnexae] : normal [Declined] : Patient declined rectal exam [FreeTextEntry1] : Verónica Carrillo, [facility]

## 2023-04-05 NOTE — END OF VISIT
[FreeTextEntry3] : I, Verónica Lorena solely acted as a scribe for Dr. Everette Martinez on 04/05/2023  All medical entries made by the scribe were at my, Dr. Martinez's, direction and personally dictated by me on 04/05/2023. I have reviewed the chart and agree that the record accurately reflects my personal performance of the history, physical exam, assessment and plan. I have also personally directed, reviewed, and agreed with the chart.

## 2023-04-11 ENCOUNTER — OUTPATIENT (OUTPATIENT)
Dept: OUTPATIENT SERVICES | Facility: HOSPITAL | Age: 58
LOS: 1 days | End: 2023-04-11
Payer: COMMERCIAL

## 2023-04-11 ENCOUNTER — RESULT REVIEW (OUTPATIENT)
Age: 58
End: 2023-04-11

## 2023-04-11 ENCOUNTER — APPOINTMENT (OUTPATIENT)
Dept: MAMMOGRAPHY | Facility: CLINIC | Age: 58
End: 2023-04-11
Payer: COMMERCIAL

## 2023-04-11 ENCOUNTER — APPOINTMENT (OUTPATIENT)
Dept: ULTRASOUND IMAGING | Facility: CLINIC | Age: 58
End: 2023-04-11
Payer: COMMERCIAL

## 2023-04-11 DIAGNOSIS — Z00.8 ENCOUNTER FOR OTHER GENERAL EXAMINATION: ICD-10-CM

## 2023-04-11 DIAGNOSIS — Z98.890 OTHER SPECIFIED POSTPROCEDURAL STATES: Chronic | ICD-10-CM

## 2023-04-11 DIAGNOSIS — C50.912 MALIGNANT NEOPLASM OF UNSPECIFIED SITE OF LEFT FEMALE BREAST: ICD-10-CM

## 2023-04-11 PROCEDURE — 77062 BREAST TOMOSYNTHESIS BI: CPT | Mod: 26

## 2023-04-11 PROCEDURE — G0279: CPT

## 2023-04-11 PROCEDURE — 76641 ULTRASOUND BREAST COMPLETE: CPT

## 2023-04-11 PROCEDURE — 76641 ULTRASOUND BREAST COMPLETE: CPT | Mod: 26,50

## 2023-04-11 PROCEDURE — 77066 DX MAMMO INCL CAD BI: CPT | Mod: 26

## 2023-04-11 PROCEDURE — 77066 DX MAMMO INCL CAD BI: CPT

## 2023-05-19 ENCOUNTER — APPOINTMENT (OUTPATIENT)
Dept: PLASTIC SURGERY | Facility: CLINIC | Age: 58
End: 2023-05-19
Payer: SELF-PAY

## 2023-05-19 VITALS
WEIGHT: 123 LBS | SYSTOLIC BLOOD PRESSURE: 129 MMHG | HEIGHT: 66 IN | TEMPERATURE: 208.4 F | BODY MASS INDEX: 19.77 KG/M2 | DIASTOLIC BLOOD PRESSURE: 86 MMHG | OXYGEN SATURATION: 98 % | HEART RATE: 76 BPM

## 2023-05-19 PROCEDURE — 99213 OFFICE O/P EST LOW 20 MIN: CPT

## 2023-05-19 NOTE — PHYSICAL EXAM
[Normocephalic] : normocephalic [Atraumatic] : atraumatic [EOMI] : extra ocular movement intact [Sclera nonicteric] : sclera nonicteric [Supple] : supple [No Supraclavicular Adenopathy] : no supraclavicular adenopathy [No Cervical Adenopathy] : no cervical adenopathy [No Thyromegaly] : no thyromegaly [Examined in the supine and seated position] : examined in the supine and seated position [No dominant masses] : no dominant masses in right breast  [No dominant masses] : no dominant masses left breast [No Nipple Retraction] : no left nipple retraction [No Nipple Discharge] : no left nipple discharge [No Axillary Lymphadenopathy] : no left axillary lymphadenopathy [No Edema] : no edema [No Rashes] : no rashes [No Ulceration] : no ulceration [de-identified] : Well healed right outer periareolar incision.  [de-identified] : Left upper outer periareolar lumpectomy scar and left axillary incision.

## 2023-05-19 NOTE — ASSESSMENT
[FreeTextEntry1] : History of left breast cancer (Stage IA, 3/2019)\par Clinical breast exam negative\par \par 1. Annual bilateral mammogram and breast ultrasound due 3/2024\par 2. Follow up office visit due 11/2023\par 3. Advised monthly self breast examinations and advised her to contact me if she has any concerns.\par 4. Gave her the list of bra boutiques\par \par Patient seen and examined with my PA Chika Sparks present \par

## 2023-05-19 NOTE — CONSULT LETTER
[Dear  ___] : Dear  [unfilled], [Please see my note below.] : Please see my note below. [Consult Closing:] : Thank you very much for allowing me to participate in the care of this patient.  If you have any questions, please do not hesitate to contact me. [Sincerely,] : Sincerely, [DrRuthann  ___] : Dr. SPICER [Courtesy Letter:] : I had the pleasure of seeing your patient, [unfilled], in my office today. [FreeTextEntry3] : Susan M. Palleschi, MD, FACS\par Division of Breast Surgery\par Director, Breast Surgery\par [facility]\par [address] [place] [address]\par [address]\[place] [address]\par (Phone) [phone]\par (Fax) [phone]

## 2023-05-19 NOTE — HISTORY OF PRESENT ILLNESS
[FreeTextEntry1] : Patient is a 58 year old female here for breast cancer recurrence surveillance. \par She has a history of left breast cancer (Stage IA, 3/2019), s/p left lumpectomy, XRT (Dr. Guzman)\par No family history of breast cancer. \par 2/1/2019 Invitae genetic testing negative. \par 3/29/2019 Oncotype Dx recurrence score 11. \par Med ONC: Dr. Roper; she deferred endocrine therapy\par OBGYN: Dr. Everette Martinez, who follows her ovarian cyst, vaginal irritation on testing, and frequent UTIs. Saw him last week.\par 3/17/2021 Bilateral mammogram/ultrasound: right 12:00 focal calcifications increased with a linear appearance. Bi-rads 4B, advise stereotactic biopsy.\par 3/26/2021 Right stereotactic biopsy: Cork shaped clip placed. Pathology benign. Advise resuming annual mammography. \par 3/13/2023 Bilateral mammogram: extremely dense breasts; stable postlumpectomy changes on the left.  Postop change within the right breast as well as additional benign postoperative change within the left breast is again noted.  Patchy foci of asymmetric fibroglandular tissue bilaterally.  Multiple scattered calcifications are again identified bilaterally, more numerous within the right breast and within the left, several of which are in more focal aggregates.  This includes a focal aggregate of intermixed coarse and fine punctate calcifications within the posterior right upper outer quadrant which appear essentially mammographically stable.  Scattered finer calcifications are also noted within the right upper outer quadrant which also appear essentially unchanged.  The clip is noted within the posterior approximate 12:00 axis of the right breast from most recent stereotactic biopsy.  Scattered adjacent fine punctate calcifications appear unchanged.  2 additional clips are noted within the right breast.  A clip is also noted in place within the left breast.\par Bilateral ultrasound: Right hypoechoic nodule retroareolar 9:00 and a superficial location and appears unchanged measuring 0.5 x 0.4 x 0.7 cm, mild retroareolar ductal ectasia is again noted;  Left postlumpectomy change at the scar site which extends from the 10-2 o'clock axis, 3 cm from the nipple.  BI-RADS 2\par Up to date with MDs \par She denies any current breast concerns

## 2023-05-24 ENCOUNTER — APPOINTMENT (OUTPATIENT)
Dept: RADIOLOGY | Facility: CLINIC | Age: 58
End: 2023-05-24

## 2024-01-19 ENCOUNTER — APPOINTMENT (OUTPATIENT)
Dept: PLASTIC SURGERY | Facility: CLINIC | Age: 59
End: 2024-01-19
Payer: SELF-PAY

## 2024-01-19 VITALS
DIASTOLIC BLOOD PRESSURE: 82 MMHG | HEART RATE: 85 BPM | OXYGEN SATURATION: 99 % | TEMPERATURE: 208.76 F | WEIGHT: 120 LBS | HEIGHT: 67 IN | BODY MASS INDEX: 18.83 KG/M2 | SYSTOLIC BLOOD PRESSURE: 129 MMHG

## 2024-01-19 PROCEDURE — 99213 OFFICE O/P EST LOW 20 MIN: CPT

## 2024-01-19 NOTE — HISTORY OF PRESENT ILLNESS
[FreeTextEntry1] : Patient is a 58 year old female here for breast cancer recurrence surveillance.  She has a history of left breast cancer (Stage IA, 3/2019), s/p left lumpectomy, XRT (Dr. Guzman) No family history of breast cancer.  2/1/2019 Invitae genetic testing negative.  3/29/2019 Oncotype Dx recurrence score 11.  Med ONC: Dr. Roper; she deferred endocrine therapy 3/26/2021 Right stereotactic biopsy benign.  4/13/2023 Bilateral mammogram: extremely dense breasts; stable postlumpectomy changes on the left.  Postop change within the right breast as well as additional benign postoperative change within the left breast is again noted.  Patchy foci of asymmetric fibroglandular tissue bilaterally.  Multiple scattered calcifications are again identified bilaterally, more numerous within the right breast and within the left, several of which are in more focal aggregates.  This includes a focal aggregate of intermixed coarse and fine punctate calcifications within the posterior right upper outer quadrant which appear essentially mammographically stable.  Scattered finer calcifications are also noted within the right upper outer quadrant which also appear essentially unchanged.  The clip is noted within the posterior approximate 12:00 axis of the right breast from most recent stereotactic biopsy.  Scattered adjacent fine punctate calcifications appear unchanged.  2 additional clips are noted within the right breast.  A clip is also noted in place within the left breast. Bilateral ultrasound: Right hypoechoic nodule retroareolar 9:00 and a superficial location and appears unchanged measuring 0.5 x 0.4 x 0.7 cm, mild retroareolar ductal ectasia is again noted; Left postlumpectomy change at the scar site which extends from the 10-2 o'clock axis, 3 cm from the nipple.  BI-RADS 2 She denies any current breast concerns. She has an awareness/warmth of her breasts bilaterally when she drinks.

## 2024-01-19 NOTE — PHYSICAL EXAM
[Normocephalic] : normocephalic [Atraumatic] : atraumatic [EOMI] : extra ocular movement intact [Sclera nonicteric] : sclera nonicteric [Supple] : supple [No Supraclavicular Adenopathy] : no supraclavicular adenopathy [No Cervical Adenopathy] : no cervical adenopathy [No Thyromegaly] : no thyromegaly [Examined in the supine and seated position] : examined in the supine and seated position [No dominant masses] : no dominant masses in right breast  [No dominant masses] : no dominant masses left breast [No Nipple Retraction] : no left nipple retraction [No Nipple Discharge] : no left nipple discharge [No Axillary Lymphadenopathy] : no left axillary lymphadenopathy [No Edema] : no edema [No Rashes] : no rashes [No Ulceration] : no ulceration [de-identified] : Well healed right outer periareolar incision.  [de-identified] : Left upper outer periareolar lumpectomy scar and left axillary incision.

## 2024-01-19 NOTE — CONSULT LETTER
[Dear  ___] : Dear  [unfilled], [Courtesy Letter:] : I had the pleasure of seeing your patient, [unfilled], in my office today. [Please see my note below.] : Please see my note below. [Consult Closing:] : Thank you very much for allowing me to participate in the care of this patient.  If you have any questions, please do not hesitate to contact me. [Sincerely,] : Sincerely, [DrRuthann  ___] : Dr. SPICER [FreeTextEntry3] : Susan M. Palleschi, MD, FACS\par  Division of Breast Surgery\par  Director, Breast Surgery\par  [facility]\par  [address] [place] [address]\par  [address]\[place] [address]\par  (Phone) [phone]\par  (Fax) [phone]

## 2024-01-19 NOTE — ASSESSMENT
[FreeTextEntry1] : History of left breast cancer (Stage IA, 3/2019) Clinical breast exam negative  1. Annual bilateral mammogram and breast ultrasound due 4/2024 2. Follow up office visit due 7/2024 3. Advised monthly self breast examinations and advised her to contact me if she has any concerns.  Patient seen and examined with my PA Chika Sparks present

## 2024-04-08 ENCOUNTER — APPOINTMENT (OUTPATIENT)
Dept: OBGYN | Facility: CLINIC | Age: 59
End: 2024-04-08
Payer: COMMERCIAL

## 2024-04-08 ENCOUNTER — LABORATORY RESULT (OUTPATIENT)
Age: 59
End: 2024-04-08

## 2024-04-08 VITALS
SYSTOLIC BLOOD PRESSURE: 138 MMHG | WEIGHT: 119 LBS | HEIGHT: 67 IN | DIASTOLIC BLOOD PRESSURE: 90 MMHG | BODY MASS INDEX: 18.68 KG/M2

## 2024-04-08 DIAGNOSIS — R92.30 DENSE BREASTS, UNSPECIFIED: ICD-10-CM

## 2024-04-08 DIAGNOSIS — Z13.820 ENCOUNTER FOR SCREENING FOR OSTEOPOROSIS: ICD-10-CM

## 2024-04-08 DIAGNOSIS — Z12.4 ENCOUNTER FOR SCREENING FOR MALIGNANT NEOPLASM OF CERVIX: ICD-10-CM

## 2024-04-08 DIAGNOSIS — Z78.0 ASYMPTOMATIC MENOPAUSAL STATE: ICD-10-CM

## 2024-04-08 DIAGNOSIS — C50.912 MALIGNANT NEOPLASM OF UNSPECIFIED SITE OF LEFT FEMALE BREAST: ICD-10-CM

## 2024-04-08 DIAGNOSIS — Z12.12 ENCOUNTER FOR SCREENING FOR MALIGNANT NEOPLASM OF RECTUM: ICD-10-CM

## 2024-04-08 LAB
BASOPHILS # BLD AUTO: 0.04 K/UL
BASOPHILS NFR BLD AUTO: 1 %
CYTOLOGY CVX/VAG DOC THIN PREP: NORMAL
EOSINOPHIL # BLD AUTO: 0.02 K/UL
EOSINOPHIL NFR BLD AUTO: 0.5 %
FERRITIN SERPL-MCNC: 65 NG/ML
FOLATE SERPL-MCNC: >20 NG/ML
HCT VFR BLD CALC: 42.5 %
HGB BLD-MCNC: 13.8 G/DL
HPV HIGH+LOW RISK DNA PNL CVX: NOT DETECTED
IMM GRANULOCYTES NFR BLD AUTO: 0.3 %
IRON SATN MFR SERPL: 20 %
IRON SERPL-MCNC: 75 UG/DL
LYMPHOCYTES # BLD AUTO: 1.6 K/UL
LYMPHOCYTES NFR BLD AUTO: 40 %
MAN DIFF?: NORMAL
MCHC RBC-ENTMCNC: 31.2 PG
MCHC RBC-ENTMCNC: 32.5 GM/DL
MCV RBC AUTO: 96.2 FL
MONOCYTES # BLD AUTO: 0.37 K/UL
MONOCYTES NFR BLD AUTO: 9.3 %
NEUTROPHILS # BLD AUTO: 1.96 K/UL
NEUTROPHILS NFR BLD AUTO: 48.9 %
PLATELET # BLD AUTO: 294 K/UL
RBC # BLD: 4.42 M/UL
RBC # BLD: 4.42 M/UL
RBC # FLD: 12.8 %
RETICS # AUTO: 1.1 %
RETICS AGGREG/RBC NFR: 49.9 K/UL
TIBC SERPL-MCNC: 374 UG/DL
UIBC SERPL-MCNC: 299 UG/DL
VIT B12 SERPL-MCNC: 597 PG/ML
WBC # FLD AUTO: 4 K/UL

## 2024-04-08 PROCEDURE — 99396 PREV VISIT EST AGE 40-64: CPT

## 2024-04-08 PROCEDURE — 82270 OCCULT BLOOD FECES: CPT

## 2024-04-10 ENCOUNTER — NON-APPOINTMENT (OUTPATIENT)
Age: 59
End: 2024-04-10

## 2024-04-10 ENCOUNTER — APPOINTMENT (OUTPATIENT)
Dept: CARDIOLOGY | Facility: CLINIC | Age: 59
End: 2024-04-10
Payer: COMMERCIAL

## 2024-04-10 VITALS
SYSTOLIC BLOOD PRESSURE: 119 MMHG | DIASTOLIC BLOOD PRESSURE: 79 MMHG | HEIGHT: 67 IN | BODY MASS INDEX: 18.68 KG/M2 | WEIGHT: 119 LBS | RESPIRATION RATE: 19 BRPM | OXYGEN SATURATION: 96 % | HEART RATE: 83 BPM

## 2024-04-10 DIAGNOSIS — Z82.49 FAMILY HISTORY OF ISCHEMIC HEART DISEASE AND OTHER DISEASES OF THE CIRCULATORY SYSTEM: ICD-10-CM

## 2024-04-10 DIAGNOSIS — R94.31 ABNORMAL ELECTROCARDIOGRAM [ECG] [EKG]: ICD-10-CM

## 2024-04-10 PROCEDURE — 93000 ELECTROCARDIOGRAM COMPLETE: CPT | Mod: 59

## 2024-04-10 PROCEDURE — 93015 CV STRESS TEST SUPVJ I&R: CPT

## 2024-04-10 PROCEDURE — 99204 OFFICE O/P NEW MOD 45 MIN: CPT | Mod: 25

## 2024-04-10 PROCEDURE — ZZZZZ: CPT

## 2024-04-11 PROBLEM — Z82.49 FAMILY HISTORY OF ATRIAL FIBRILLATION: Status: ACTIVE | Noted: 2024-04-11

## 2024-04-11 PROBLEM — Z82.49 FAMILY HISTORY OF MITRAL VALVE PROLAPSE: Status: ACTIVE | Noted: 2024-04-11

## 2024-04-12 ENCOUNTER — APPOINTMENT (OUTPATIENT)
Dept: ULTRASOUND IMAGING | Facility: CLINIC | Age: 59
End: 2024-04-12
Payer: COMMERCIAL

## 2024-04-12 ENCOUNTER — RESULT REVIEW (OUTPATIENT)
Age: 59
End: 2024-04-12

## 2024-04-12 ENCOUNTER — APPOINTMENT (OUTPATIENT)
Dept: RADIOLOGY | Facility: CLINIC | Age: 59
End: 2024-04-12
Payer: COMMERCIAL

## 2024-04-12 ENCOUNTER — OUTPATIENT (OUTPATIENT)
Dept: OUTPATIENT SERVICES | Facility: HOSPITAL | Age: 59
LOS: 1 days | End: 2024-04-12
Payer: COMMERCIAL

## 2024-04-12 ENCOUNTER — APPOINTMENT (OUTPATIENT)
Dept: MAMMOGRAPHY | Facility: CLINIC | Age: 59
End: 2024-04-12
Payer: COMMERCIAL

## 2024-04-12 DIAGNOSIS — Z00.8 ENCOUNTER FOR OTHER GENERAL EXAMINATION: ICD-10-CM

## 2024-04-12 DIAGNOSIS — Z98.890 OTHER SPECIFIED POSTPROCEDURAL STATES: Chronic | ICD-10-CM

## 2024-04-12 DIAGNOSIS — R92.30 DENSE BREASTS, UNSPECIFIED: ICD-10-CM

## 2024-04-12 DIAGNOSIS — C50.912 MALIGNANT NEOPLASM OF UNSPECIFIED SITE OF LEFT FEMALE BREAST: ICD-10-CM

## 2024-04-12 PROCEDURE — 77067 SCR MAMMO BI INCL CAD: CPT | Mod: 26

## 2024-04-12 PROCEDURE — 77063 BREAST TOMOSYNTHESIS BI: CPT | Mod: 26

## 2024-04-12 PROCEDURE — 77080 DXA BONE DENSITY AXIAL: CPT | Mod: 26

## 2024-04-12 PROCEDURE — 77080 DXA BONE DENSITY AXIAL: CPT

## 2024-04-12 PROCEDURE — 76641 ULTRASOUND BREAST COMPLETE: CPT | Mod: 26,50

## 2024-04-12 PROCEDURE — 76641 ULTRASOUND BREAST COMPLETE: CPT

## 2024-04-12 PROCEDURE — 77063 BREAST TOMOSYNTHESIS BI: CPT

## 2024-04-12 PROCEDURE — 77067 SCR MAMMO BI INCL CAD: CPT

## 2024-04-13 LAB
ALBUMIN SERPL ELPH-MCNC: 4.9 G/DL
ALP BLD-CCNC: 68 U/L
ALT SERPL-CCNC: 23 U/L
ANION GAP SERPL CALC-SCNC: 12 MMOL/L
AST SERPL-CCNC: 27 U/L
BILIRUB SERPL-MCNC: 0.5 MG/DL
BUN SERPL-MCNC: 15 MG/DL
CALCIUM SERPL-MCNC: 9.8 MG/DL
CHLORIDE SERPL-SCNC: 100 MMOL/L
CHOLEST SERPL-MCNC: 256 MG/DL
CO2 SERPL-SCNC: 26 MMOL/L
CREAT SERPL-MCNC: 0.78 MG/DL
EGFR: 87 ML/MIN/1.73M2
ESTIMATED AVERAGE GLUCOSE: 111 MG/DL
GLUCOSE SERPL-MCNC: 93 MG/DL
HBA1C MFR BLD HPLC: 5.5 %
HDLC SERPL-MCNC: 118 MG/DL
LDLC SERPL CALC-MCNC: 128 MG/DL
NONHDLC SERPL-MCNC: 138 MG/DL
POTASSIUM SERPL-SCNC: 4.8 MMOL/L
PROT SERPL-MCNC: 7.1 G/DL
SODIUM SERPL-SCNC: 138 MMOL/L
TRIGL SERPL-MCNC: 58 MG/DL
TSH SERPL-ACNC: 3.89 UIU/ML

## 2024-05-07 ENCOUNTER — APPOINTMENT (OUTPATIENT)
Dept: CARDIOLOGY | Facility: CLINIC | Age: 59
End: 2024-05-07
Payer: COMMERCIAL

## 2024-05-07 PROCEDURE — 93306 TTE W/DOPPLER COMPLETE: CPT

## 2024-05-08 DIAGNOSIS — I35.1 NONRHEUMATIC AORTIC (VALVE) INSUFFICIENCY: ICD-10-CM

## 2024-05-27 LAB
CYTOLOGY CVX/VAG DOC THIN PREP: ABNORMAL
DATE COLLECTED: NORMAL
HEMOCCULT SP1 STL QL: NEGATIVE
HPV HIGH+LOW RISK DNA PNL CVX: DETECTED
QUALITY CONTROL: YES

## 2024-05-27 NOTE — PHYSICAL EXAM
[Chaperone Present] : A chaperone was present in the examining room during all aspects of the physical examination [Appropriately responsive] : appropriately responsive [Alert] : alert [No Acute Distress] : no acute distress [No Lymphadenopathy] : no lymphadenopathy [Soft] : soft [Non-tender] : non-tender [Non-distended] : non-distended [No HSM] : No HSM [No Lesions] : no lesions [No Mass] : no mass [Oriented x3] : oriented x3 [Examination Of The Breasts] : a normal appearance [No Masses] : no breast masses were palpable [Labia Majora] : normal [Labia Minora] : normal [Normal] : normal [Uterine Adnexae] : normal [Normal rectal exam] : was normal [Normal Brown Stool] : was normal and brown [FreeTextEntry1] : Theresa Israel MA and Kami Pino [Occult Blood Positive] : was negative for occult blood analysis

## 2024-05-27 NOTE — DISCUSSION/SUMMARY
[FreeTextEntry1] : -Annual gynecology exam- 1) Pap smear collected. 2) Benign rectal exam. Hemoccult negative. 3) Prescription for mammogram screening, breast ultrasound, and DEXA studies ordered.   -During this visit comprehensive counseling was given regarding the following concerns: 1. We discussed the need for proper nutrition and exercise. This includes cardiovascular and pelvic floor exercises. 2. We discussed the importance of maintaining proper vaccination schedule and we discussed the utility of the HPV, Influenza, Shingles, and TDaP vaccines. 3. We discussed the impact of chronic sun exposure and the risk for skin disease as a result. The benefits of a total body scan by a Dermatologist was reviewed. 4. We discussed the need for certain supplements for most people, which include Vitamin D3 (2000 IU) and calcium rich foods with limitation on calcium supplementation by tabular form to 600 MG by mouth daily. As part of bone health program, we recommend weight bearing exercises and limited sun exposure (10-15 minutes daily) to help convert Vitamin D to its active form.   -She will follow up in 1 year or as needed. All questions and concerns were addressed.

## 2024-05-27 NOTE — HISTORY OF PRESENT ILLNESS
[postmenopausal] : postmenopausal [N] : Patient does not use contraception [Y] : Positive pregnancy history [Menarche Age: ____] : age at menarche was [unfilled] [Currently Active] : currently active [Men] : men [Mammogramdate] : 04/11/2023 [BreastSonogramDate] : 04/11/2023 [TextBox_19] : BR2 [TextBox_25] : BR2 [PapSmeardate] : 04/05/2023 [TextBox_31] : NORMAL [BoneDensityDate] : 11/22/2021 [TextBox_37] : OSTEOPENIA [ColonoscopyDate] : 2021 [HPVDate] : 04/05/2023 [TextBox_78] : NEG [LMPDate] : 2019 [PGHxTotal] : 2 [facility] : 2 [FreeTextEntry1] : 2019

## 2024-05-27 NOTE — END OF VISIT
[FreeTextEntry3] : I, Daksha Jimenes solely acted as a scribe for Dr. Everette Martinez on 04/08/2024 . All medical entries made by the scribe were at my, Dr. Martinez's, direction and personally dictated by me on 04/08/2024 . I have reviewed the chart and agree that the record accurately reflects my personal performance of the history, physical exam, assessment and plan. I have also personally directed, reviewed, and agreed with the chart.

## 2024-07-29 ENCOUNTER — APPOINTMENT (OUTPATIENT)
Dept: PLASTIC SURGERY | Facility: CLINIC | Age: 59
End: 2024-07-29
Payer: SELF-PAY

## 2024-07-29 VITALS
WEIGHT: 119 LBS | HEIGHT: 67 IN | SYSTOLIC BLOOD PRESSURE: 121 MMHG | BODY MASS INDEX: 18.68 KG/M2 | TEMPERATURE: 208.58 F | OXYGEN SATURATION: 98 % | HEART RATE: 65 BPM | DIASTOLIC BLOOD PRESSURE: 78 MMHG

## 2024-07-29 DIAGNOSIS — C50.912 MALIGNANT NEOPLASM OF UNSPECIFIED SITE OF LEFT FEMALE BREAST: ICD-10-CM

## 2024-07-29 DIAGNOSIS — N64.4 MASTODYNIA: ICD-10-CM

## 2024-07-29 PROCEDURE — 99213 OFFICE O/P EST LOW 20 MIN: CPT

## 2024-07-29 NOTE — ASSESSMENT
[FreeTextEntry1] : History of left breast cancer (Stage IA, 3/2019) Recent symptoms of left upper breast swelling and tenderness. Clinical breast exam negative Discussed with her that her clinical breast examination is normal.  I discussed with her that the left breast symptoms of swelling and tenderness may in part be musculoskeletal.  1. Annual bilateral mammogram and breast ultrasound due 4/2025. 2. Follow up office visit due 7/2025. 3. Advised monthly self breast examinations and advised her to contact me if she has any concerns. 4.  I discussed with her that should the symptoms worsen or persist, could consider a targeted left ultrasound but that her clinical examination is normal and her most recent imaging 4/2024 were negative.

## 2024-07-29 NOTE — CONSULT LETTER
[Dear  ___] : Dear  [unfilled], [Courtesy Letter:] : I had the pleasure of seeing your patient, [unfilled], in my office today. [Please see my note below.] : Please see my note below. [Consult Closing:] : Thank you very much for allowing me to participate in the care of this patient.  If you have any questions, please do not hesitate to contact me. [Sincerely,] : Sincerely, [DrRuthann  ___] : Dr. SPICER [FreeTextEntry3] : Susan M. Palleschi, MD, FACS\par  Division of Breast Surgery\par  Director, Breast Surgery\par  [facility]\par  [address] [place] [address]\par  [address]\[place] [address]\par  (Phone) [phone]\par  (Fax) [phone]

## 2024-07-29 NOTE — CONSULT LETTER
[Dear  ___] : Dear  [unfilled], [Courtesy Letter:] : I had the pleasure of seeing your patient, [unfilled], in my office today. [Please see my note below.] : Please see my note below. [Consult Closing:] : Thank you very much for allowing me to participate in the care of this patient.  If you have any questions, please do not hesitate to contact me. [Sincerely,] : Sincerely, [DrRuthann  ___] : Dr. SPICER [FreeTextEntry3] : Susan M. Palleschi, MD, FACS\par  Division of Breast Surgery\par  Director, Breast Surgery\par  [facility]\par  [address] [place] [address]\par  [address]\[place]  [place] [address]\par  (Phone) [phone]\par  (Fax) [phone]

## 2024-07-29 NOTE — PHYSICAL EXAM
[Normocephalic] : normocephalic [Atraumatic] : atraumatic [EOMI] : extra ocular movement intact [Sclera nonicteric] : sclera nonicteric [Supple] : supple [No Supraclavicular Adenopathy] : no supraclavicular adenopathy [No Cervical Adenopathy] : no cervical adenopathy [No Thyromegaly] : no thyromegaly [Examined in the supine and seated position] : examined in the supine and seated position [No dominant masses] : no dominant masses in right breast  [No dominant masses] : no dominant masses left breast [No Nipple Retraction] : no left nipple retraction [No Nipple Discharge] : no left nipple discharge [No Axillary Lymphadenopathy] : no left axillary lymphadenopathy [No Edema] : no edema [No Rashes] : no rashes [No Ulceration] : no ulceration [de-identified] : Well healed right outer periareolar incision.  [de-identified] : Left upper outer periareolar lumpectomy scar and left axillary incision.  The area of concern for the patient is in the left upper breast.  The tissue is soft without palpable masses or any clinical evidence of swelling.  The skin is normal.  The site of concern is medial to the site of retraction related to the previous lumpectomy.

## 2024-07-29 NOTE — PHYSICAL EXAM
[Normocephalic] : normocephalic [Atraumatic] : atraumatic [EOMI] : extra ocular movement intact [Sclera nonicteric] : sclera nonicteric [Supple] : supple [No Supraclavicular Adenopathy] : no supraclavicular adenopathy [No Cervical Adenopathy] : no cervical adenopathy [No Thyromegaly] : no thyromegaly [Examined in the supine and seated position] : examined in the supine and seated position [No dominant masses] : no dominant masses in right breast  [No dominant masses] : no dominant masses left breast [No Nipple Retraction] : no left nipple retraction [No Nipple Discharge] : no left nipple discharge [No Axillary Lymphadenopathy] : no left axillary lymphadenopathy [No Edema] : no edema [No Rashes] : no rashes [No Ulceration] : no ulceration [de-identified] : Well healed right outer periareolar incision.  [de-identified] : Left upper outer periareolar lumpectomy scar and left axillary incision.  The area of concern for the patient is in the left upper breast.  The tissue is soft without palpable masses or any clinical evidence of swelling.  The skin is normal.  The site of concern is medial to the site of retraction related to the previous lumpectomy.

## 2024-07-29 NOTE — HISTORY OF PRESENT ILLNESS
[FreeTextEntry1] : Patient is a 59 year old female here for follow up office visit. She has a history of left breast cancer (Stage IA, 3/2019), s/p left lumpectomy, XRT (Dr. Guzman) No family history of breast cancer. 2/1/2019 [facility] genetic testing negative.  3/29/2019 Oncotype Dx recurrence score 11.  Med ONC: Dr. Roper; she deferred endocrine therapy 3/26/2021 Right stereotactic biopsy benign.  4/12/2024 Bilateral mammogram: Extremely dense breasts. There are stable post lumpectomy and postsurgical changes in the left breast and postsurgical changes in the right breast. There are 3 right biopsy markers and one left breast biopsy marker in stable position. Bilateral calcifications are stable. Bilateral ultrasound: Right 9:00 retroareolar region the previously reported finding is not seen. Left 2:00 (N3cm) is a scar site. BI-RADS 2. She reports a 1 week history of left upper breast swelling with associated tenderness.  She states the tenderness is improving.  She denies any self detected breast masses.  She denies any trauma or injury.  She was sick last week and was coughing a lot.  She is here with her friend, Kenan.

## 2024-12-24 PROBLEM — F10.90 ALCOHOL USE: Status: ACTIVE | Noted: 2019-04-08

## 2025-08-15 ENCOUNTER — APPOINTMENT (OUTPATIENT)
Dept: PLASTIC SURGERY | Facility: CLINIC | Age: 60
End: 2025-08-15
Payer: SELF-PAY

## 2025-08-15 VITALS
WEIGHT: 119 LBS | SYSTOLIC BLOOD PRESSURE: 130 MMHG | TEMPERATURE: 208.4 F | OXYGEN SATURATION: 98 % | BODY MASS INDEX: 18.68 KG/M2 | HEART RATE: 82 BPM | DIASTOLIC BLOOD PRESSURE: 79 MMHG | HEIGHT: 67 IN

## 2025-08-15 DIAGNOSIS — C50.912 MALIGNANT NEOPLASM OF UNSPECIFIED SITE OF LEFT FEMALE BREAST: ICD-10-CM

## 2025-08-15 PROCEDURE — 99213 OFFICE O/P EST LOW 20 MIN: CPT
